# Patient Record
Sex: MALE | Race: BLACK OR AFRICAN AMERICAN | NOT HISPANIC OR LATINO | Employment: UNEMPLOYED | URBAN - METROPOLITAN AREA
[De-identification: names, ages, dates, MRNs, and addresses within clinical notes are randomized per-mention and may not be internally consistent; named-entity substitution may affect disease eponyms.]

---

## 2020-12-14 ENCOUNTER — TELEPHONE (OUTPATIENT)
Dept: ORTHOPEDICS | Facility: CLINIC | Age: 60
End: 2020-12-14

## 2020-12-14 NOTE — TELEPHONE ENCOUNTER
----- Message from Hailey Chery sent at 12/14/2020 10:03 AM CST -----  Contact: Bhavna garcia's wife/[phone]  Who Called:Bhavna garcia's wife   Regarding: referral   Would the patient rather a call back or a response via [facility]? Call back  Best Call Back Number: [phone]  Additional Information:

## 2020-12-15 ENCOUNTER — OFFICE VISIT (OUTPATIENT)
Dept: ORTHOPEDICS | Facility: CLINIC | Age: 60
End: 2020-12-15
Payer: MEDICAID

## 2020-12-15 DIAGNOSIS — M25.552 PAIN OF LEFT HIP JOINT: Primary | ICD-10-CM

## 2020-12-15 PROCEDURE — 99999 PR PBB SHADOW E&M-EST. PATIENT-LVL II: ICD-10-PCS | Mod: PBBFAC,,, | Performed by: ORTHOPAEDIC SURGERY

## 2020-12-15 PROCEDURE — 99202 OFFICE O/P NEW SF 15 MIN: CPT | Mod: S$PBB,,, | Performed by: ORTHOPAEDIC SURGERY

## 2020-12-15 PROCEDURE — 99202 PR OFFICE/OUTPT VISIT, NEW, LEVL II, 15-29 MIN: ICD-10-PCS | Mod: S$PBB,,, | Performed by: ORTHOPAEDIC SURGERY

## 2020-12-15 PROCEDURE — 99212 OFFICE O/P EST SF 10 MIN: CPT | Mod: PBBFAC,PN | Performed by: ORTHOPAEDIC SURGERY

## 2020-12-15 PROCEDURE — 99999 PR PBB SHADOW E&M-EST. PATIENT-LVL II: CPT | Mod: PBBFAC,,, | Performed by: ORTHOPAEDIC SURGERY

## 2020-12-15 RX ORDER — INDOMETHACIN 50 MG/1
50 CAPSULE ORAL 2 TIMES DAILY WITH MEALS
Qty: 60 CAPSULE | Refills: 2 | Status: SHIPPED | OUTPATIENT
Start: 2020-12-15 | End: 2021-01-19 | Stop reason: SDUPTHER

## 2020-12-15 RX ORDER — CETIRIZINE HYDROCHLORIDE 10 MG/1
TABLET ORAL
COMMUNITY
Start: 2020-10-14

## 2020-12-15 RX ORDER — CARVEDILOL 25 MG/1
TABLET ORAL
COMMUNITY
Start: 2020-11-18

## 2020-12-15 RX ORDER — BENAZEPRIL HYDROCHLORIDE 5 MG/1
5 TABLET ORAL DAILY
Status: ON HOLD | COMMUNITY
Start: 2020-11-25 | End: 2022-07-04 | Stop reason: HOSPADM

## 2020-12-15 NOTE — LETTER
December 15, 2020      Christie Márquez Mai, MD  [address] [facility]  [place] [facility] [address]           [facility]  [address], [facility] [place] [address]  [facility] [address]  Phone: [phone]  Fax: [phone]          Patient: Elias Iverson   MR Number: [id number]   YOB: 1960   Date of Visit: 12/15/2020       Dear Dr. Christie Márquez Mai:    Thank you for referring Elias Iverson to me for evaluation. Attached you will find relevant portions of my assessment and plan of care.    If you have questions, please do not hesitate to call me. I look forward to following Elias Iverson along with you.    Sincerely,    Issac Salomon MD    Enclosure  CC:  No Recipients    If you would like to receive this communication electronically, please contact [email or web address] or [phone] to request more information on [employer] access.    For providers and/or their staff who would like to refer a patient to [facility], please contact us through our one-stop-shop provider referral line, [facility], at [phone].    If you feel you have received this communication in error or would no longer like to receive these types of communications, please e-mail [email or web address]

## 2020-12-15 NOTE — PROGRESS NOTES
Subjective:      Patient ID: Elias Iverson is a 60 y.o. male.    Chief Complaint: Consult and Leg Pain (left)    HPI      Elias Iverson is seen for evaluation and treatment of hip pain.  They have experienced problems with their left hip over the past 1 month Pain is located laterally and  referred to the knee. They have been treated with NA.   Symptoms have recently stayed the same. Ambulation reportedly has not been impaired. Self care ADLs are not painful.     Review of Systems   Constitution: Negative for fever and weight loss.   HENT: Negative for congestion.    Eyes: Negative for visual disturbance.   Cardiovascular: Negative for chest pain.   Respiratory: Negative for shortness of breath.    Hematologic/Lymphatic: Negative for bleeding problem. Does not bruise/bleed easily.   Skin: Negative for poor wound healing.   Musculoskeletal: Positive for joint pain.   Gastrointestinal: Negative for abdominal pain.   Genitourinary: Negative for dysuria.   Neurological: Negative for seizures.   Psychiatric/Behavioral: Negative for altered mental status.   Allergic/Immunologic: Negative for persistent infections.         Objective:            Ortho/SPM Exam    Left hip    The patient is not in acute distress.   Body habitus is:normal.   Sclerae normal  The patient walks without a limp.   Respiratory distress:  none  The skin over the hip is:intact.   There is:no local tenderness.   Range of motion- Flexion 80, External rotation 30, internal rotation 20.  Resisted SLR positive.  Pain with rotation positive  Sciatic tension findings negative.  Shortening/lengthening compared to the contralateral side exam deferred.  Pulses DP present, PT present.  Motor normal 5/5 strength in all tested muscle groups.   Sensory normal.    I reviewed the relevant imaging for the patient's condition:  Films showing the left hip and femur show normal bone quality in joint space.  Vascular calcifications noted          Assessment:        Encounter Diagnosis   Name Primary?    Pain of left hip joint Yes          Possible causes include synovitis, early osteoarthritis and avascular necrosis.  Plan:       Elias was seen today for consult and leg pain.    Diagnoses and all orders for this visit:    Pain of left hip joint      I explained my diagnostic impression and the reasoning behind it in detail, using layman's terms.  Models and/or pictures were used to help in the explanation.    Indocin    I explained to the patient that I am providing a prescription for anti-inflammatory medication.  I warned the patient that it should not be taken with other anti-inflammatory medications including over-the-counter products containing ibuprofen and naproxen.  I advised them to consult their primary physician or pharmacist if there is any question about this in the future.  I discussed the possible side effects including cardiovascular issues, renal issues and gastrointestinal problems.  I advised the patient to discontinue the medication should they develop persistent symptoms of dyspepsia.    I also explained that should they elect to continue this medication long-term, that is beyond the prescription that I provide at this time, they should contact their primary physician for refills and appropriate monitoring.    Will consider MRI if there is no improvement

## 2021-01-19 ENCOUNTER — OFFICE VISIT (OUTPATIENT)
Dept: ORTHOPEDICS | Facility: CLINIC | Age: 61
End: 2021-01-19
Payer: MEDICAID

## 2021-01-19 VITALS — BODY MASS INDEX: 28.46 KG/M2 | WEIGHT: 210.13 LBS | HEIGHT: 72 IN

## 2021-01-19 DIAGNOSIS — M25.552 PAIN OF LEFT HIP JOINT: Primary | ICD-10-CM

## 2021-01-19 PROCEDURE — 99999 PR PBB SHADOW E&M-EST. PATIENT-LVL III: ICD-10-PCS | Mod: PBBFAC,,, | Performed by: ORTHOPAEDIC SURGERY

## 2021-01-19 PROCEDURE — 99999 PR PBB SHADOW E&M-EST. PATIENT-LVL III: CPT | Mod: PBBFAC,,, | Performed by: ORTHOPAEDIC SURGERY

## 2021-01-19 PROCEDURE — 99213 PR OFFICE/OUTPT VISIT, EST, LEVL III, 20-29 MIN: ICD-10-PCS | Mod: S$PBB,,, | Performed by: ORTHOPAEDIC SURGERY

## 2021-01-19 PROCEDURE — 99213 OFFICE O/P EST LOW 20 MIN: CPT | Mod: S$PBB,,, | Performed by: ORTHOPAEDIC SURGERY

## 2021-01-19 PROCEDURE — 99213 OFFICE O/P EST LOW 20 MIN: CPT | Mod: PBBFAC,PN | Performed by: ORTHOPAEDIC SURGERY

## 2021-01-19 RX ORDER — INDOMETHACIN 50 MG/1
50 CAPSULE ORAL 2 TIMES DAILY WITH MEALS
Qty: 60 CAPSULE | Refills: 2 | Status: SHIPPED | OUTPATIENT
Start: 2021-01-19 | End: 2022-01-03 | Stop reason: SDUPTHER

## 2021-02-21 ENCOUNTER — PATIENT MESSAGE (OUTPATIENT)
Dept: ADMINISTRATIVE | Facility: CLINIC | Age: 61
End: 2021-02-21

## 2021-02-24 ENCOUNTER — IMMUNIZATION (OUTPATIENT)
Dept: PHARMACY | Facility: CLINIC | Age: 61
End: 2021-02-24
Payer: MEDICAID

## 2021-02-24 DIAGNOSIS — Z23 NEED FOR VACCINATION: Primary | ICD-10-CM

## 2021-03-02 ENCOUNTER — OFFICE VISIT (OUTPATIENT)
Dept: ORTHOPEDICS | Facility: CLINIC | Age: 61
End: 2021-03-02
Payer: MEDICAID

## 2021-03-02 VITALS — BODY MASS INDEX: 28.46 KG/M2 | HEIGHT: 72 IN | WEIGHT: 210.13 LBS

## 2021-03-02 DIAGNOSIS — M54.9 DORSALGIA, UNSPECIFIED: ICD-10-CM

## 2021-03-02 DIAGNOSIS — M51.36 LUMBAR DEGENERATIVE DISC DISEASE: Primary | ICD-10-CM

## 2021-03-02 PROCEDURE — 99213 OFFICE O/P EST LOW 20 MIN: CPT | Mod: S$PBB,,, | Performed by: ORTHOPAEDIC SURGERY

## 2021-03-02 PROCEDURE — 99999 PR PBB SHADOW E&M-EST. PATIENT-LVL III: CPT | Mod: PBBFAC,,, | Performed by: ORTHOPAEDIC SURGERY

## 2021-03-02 PROCEDURE — 99999 PR PBB SHADOW E&M-EST. PATIENT-LVL III: ICD-10-PCS | Mod: PBBFAC,,, | Performed by: ORTHOPAEDIC SURGERY

## 2021-03-02 PROCEDURE — 99213 PR OFFICE/OUTPT VISIT, EST, LEVL III, 20-29 MIN: ICD-10-PCS | Mod: S$PBB,,, | Performed by: ORTHOPAEDIC SURGERY

## 2021-03-02 PROCEDURE — 99213 OFFICE O/P EST LOW 20 MIN: CPT | Mod: PBBFAC,PN | Performed by: ORTHOPAEDIC SURGERY

## 2021-03-09 ENCOUNTER — TELEPHONE (OUTPATIENT)
Dept: ORTHOPEDICS | Facility: CLINIC | Age: 61
End: 2021-03-09

## 2021-03-09 DIAGNOSIS — M51.36 LUMBAR DEGENERATIVE DISC DISEASE: Primary | ICD-10-CM

## 2021-03-24 ENCOUNTER — IMMUNIZATION (OUTPATIENT)
Dept: PHARMACY | Facility: CLINIC | Age: 61
End: 2021-03-24

## 2021-03-24 DIAGNOSIS — Z23 NEED FOR VACCINATION: Primary | ICD-10-CM

## 2021-03-29 ENCOUNTER — CLINICAL SUPPORT (OUTPATIENT)
Dept: REHABILITATION | Facility: HOSPITAL | Age: 61
End: 2021-03-29
Payer: MEDICAID

## 2021-03-29 DIAGNOSIS — M54.50 ACUTE MIDLINE LOW BACK PAIN WITHOUT SCIATICA: ICD-10-CM

## 2021-03-29 DIAGNOSIS — M53.86 DECREASED RANGE OF MOTION OF LUMBAR SPINE: ICD-10-CM

## 2021-03-29 DIAGNOSIS — M51.36 LUMBAR DEGENERATIVE DISC DISEASE: ICD-10-CM

## 2021-03-29 PROCEDURE — 97161 PT EVAL LOW COMPLEX 20 MIN: CPT | Mod: PN

## 2021-03-30 PROBLEM — M53.86 DECREASED RANGE OF MOTION OF LUMBAR SPINE: Status: ACTIVE | Noted: 2021-03-30

## 2021-03-30 PROBLEM — M54.50 ACUTE MIDLINE LOW BACK PAIN WITHOUT SCIATICA: Status: ACTIVE | Noted: 2021-03-30

## 2021-04-21 ENCOUNTER — CLINICAL SUPPORT (OUTPATIENT)
Dept: REHABILITATION | Facility: HOSPITAL | Age: 61
End: 2021-04-21
Payer: MEDICAID

## 2021-04-21 DIAGNOSIS — M54.50 ACUTE MIDLINE LOW BACK PAIN WITHOUT SCIATICA: ICD-10-CM

## 2021-04-21 DIAGNOSIS — M53.86 DECREASED RANGE OF MOTION OF LUMBAR SPINE: ICD-10-CM

## 2021-04-21 PROCEDURE — 97110 THERAPEUTIC EXERCISES: CPT | Mod: PN

## 2021-04-26 ENCOUNTER — CLINICAL SUPPORT (OUTPATIENT)
Dept: REHABILITATION | Facility: HOSPITAL | Age: 61
End: 2021-04-26
Payer: MEDICAID

## 2021-04-26 DIAGNOSIS — M54.50 ACUTE MIDLINE LOW BACK PAIN WITHOUT SCIATICA: ICD-10-CM

## 2021-04-26 DIAGNOSIS — M53.86 DECREASED RANGE OF MOTION OF LUMBAR SPINE: ICD-10-CM

## 2021-04-26 PROCEDURE — 97110 THERAPEUTIC EXERCISES: CPT | Mod: PN

## 2021-04-26 PROCEDURE — 97140 MANUAL THERAPY 1/> REGIONS: CPT | Mod: PN

## 2021-04-28 ENCOUNTER — CLINICAL SUPPORT (OUTPATIENT)
Dept: REHABILITATION | Facility: HOSPITAL | Age: 61
End: 2021-04-28
Payer: MEDICAID

## 2021-04-28 DIAGNOSIS — M53.86 DECREASED RANGE OF MOTION OF LUMBAR SPINE: ICD-10-CM

## 2021-04-28 DIAGNOSIS — M54.50 ACUTE MIDLINE LOW BACK PAIN WITHOUT SCIATICA: ICD-10-CM

## 2021-04-28 PROCEDURE — 97110 THERAPEUTIC EXERCISES: CPT | Mod: PN

## 2021-04-28 PROCEDURE — 97140 MANUAL THERAPY 1/> REGIONS: CPT | Mod: PN

## 2021-05-05 ENCOUNTER — CLINICAL SUPPORT (OUTPATIENT)
Dept: REHABILITATION | Facility: HOSPITAL | Age: 61
End: 2021-05-05
Payer: MEDICAID

## 2021-05-05 DIAGNOSIS — M53.86 DECREASED RANGE OF MOTION OF LUMBAR SPINE: ICD-10-CM

## 2021-05-05 DIAGNOSIS — M54.50 ACUTE MIDLINE LOW BACK PAIN WITHOUT SCIATICA: ICD-10-CM

## 2021-05-05 PROCEDURE — 97110 THERAPEUTIC EXERCISES: CPT | Mod: PN

## 2021-05-05 PROCEDURE — 97140 MANUAL THERAPY 1/> REGIONS: CPT | Mod: PN

## 2021-05-06 ENCOUNTER — OFFICE VISIT (OUTPATIENT)
Dept: ORTHOPEDICS | Facility: CLINIC | Age: 61
End: 2021-05-06
Payer: MEDICAID

## 2021-05-06 VITALS
BODY MASS INDEX: 29.12 KG/M2 | DIASTOLIC BLOOD PRESSURE: 78 MMHG | HEIGHT: 72 IN | SYSTOLIC BLOOD PRESSURE: 136 MMHG | WEIGHT: 215 LBS

## 2021-05-06 DIAGNOSIS — M25.552 PAIN OF LEFT HIP JOINT: ICD-10-CM

## 2021-05-06 DIAGNOSIS — M54.9 DORSALGIA, UNSPECIFIED: ICD-10-CM

## 2021-05-06 DIAGNOSIS — M54.14 THORACIC AND LUMBOSACRAL NEURITIS: ICD-10-CM

## 2021-05-06 DIAGNOSIS — M54.42 BILATERAL LOW BACK PAIN WITH LEFT-SIDED SCIATICA, UNSPECIFIED CHRONICITY: Primary | ICD-10-CM

## 2021-05-06 DIAGNOSIS — M54.17 THORACIC AND LUMBOSACRAL NEURITIS: ICD-10-CM

## 2021-05-06 PROCEDURE — 99999 PR PBB SHADOW E&M-EST. PATIENT-LVL III: CPT | Mod: PBBFAC,,, | Performed by: PHYSICIAN ASSISTANT

## 2021-05-06 PROCEDURE — 99999 PR PBB SHADOW E&M-EST. PATIENT-LVL III: ICD-10-PCS | Mod: PBBFAC,,, | Performed by: PHYSICIAN ASSISTANT

## 2021-05-06 PROCEDURE — 99214 PR OFFICE/OUTPT VISIT, EST, LEVL IV, 30-39 MIN: ICD-10-PCS | Mod: S$PBB,,, | Performed by: PHYSICIAN ASSISTANT

## 2021-05-06 PROCEDURE — 99214 OFFICE O/P EST MOD 30 MIN: CPT | Mod: S$PBB,,, | Performed by: PHYSICIAN ASSISTANT

## 2021-05-06 PROCEDURE — 99213 OFFICE O/P EST LOW 20 MIN: CPT | Mod: PBBFAC,PN | Performed by: PHYSICIAN ASSISTANT

## 2021-05-07 ENCOUNTER — PATIENT MESSAGE (OUTPATIENT)
Dept: ORTHOPEDICS | Facility: CLINIC | Age: 61
End: 2021-05-07

## 2021-05-14 ENCOUNTER — PATIENT MESSAGE (OUTPATIENT)
Dept: ORTHOPEDICS | Facility: CLINIC | Age: 61
End: 2021-05-14

## 2021-06-16 ENCOUNTER — DOCUMENTATION ONLY (OUTPATIENT)
Dept: REHABILITATION | Facility: HOSPITAL | Age: 61
End: 2021-06-16

## 2021-06-16 PROBLEM — M54.50 ACUTE MIDLINE LOW BACK PAIN WITHOUT SCIATICA: Status: RESOLVED | Noted: 2021-03-30 | Resolved: 2021-06-16

## 2021-06-16 PROBLEM — M53.86 DECREASED RANGE OF MOTION OF LUMBAR SPINE: Status: RESOLVED | Noted: 2021-03-30 | Resolved: 2021-06-16

## 2021-08-23 ENCOUNTER — IMMUNIZATION (OUTPATIENT)
Dept: PHARMACY | Facility: CLINIC | Age: 61
End: 2021-08-23
Payer: MEDICAID

## 2021-08-23 DIAGNOSIS — Z23 NEED FOR VACCINATION: Primary | ICD-10-CM

## 2022-01-03 ENCOUNTER — TELEPHONE (OUTPATIENT)
Dept: ORTHOPEDICS | Facility: CLINIC | Age: 62
End: 2022-01-03
Payer: MEDICAID

## 2022-01-03 DIAGNOSIS — M25.552 PAIN OF LEFT HIP JOINT: Primary | ICD-10-CM

## 2022-01-03 RX ORDER — INDOMETHACIN 50 MG/1
50 CAPSULE ORAL 2 TIMES DAILY WITH MEALS
Qty: 60 CAPSULE | Refills: 2 | Status: SHIPPED | OUTPATIENT
Start: 2022-01-03 | End: 2022-01-04

## 2022-01-03 NOTE — TELEPHONE ENCOUNTER
Patient walked into clinic asking for refill of indocin.     He is having left groin/hip pain again and indocin helped. Refill sent to [facility] in [place].     If no improvement, he will make f/u with me or Aime.

## 2022-01-18 DIAGNOSIS — M25.552 BILATERAL HIP PAIN: Primary | ICD-10-CM

## 2022-01-18 DIAGNOSIS — M25.551 BILATERAL HIP PAIN: Primary | ICD-10-CM

## 2022-01-18 NOTE — PROGRESS NOTES
Subjective:      Patient ID: Elias Iverson is a 61 y.o. male.    Chief Complaint: No chief complaint on file.      HPI  (Aime)    Last seen by me on 5/6/21 for LBP and left groin pain that was felt to be spine mediated. No relief with PT. Left hip XRs looked good.     MRI of lumbar spine 5/14/21 showed facet hypertrophy L3-S1 with mild bilateral foraminal stenosis L5-S1. He was to follow up with PCP to discuss referral to spine specialist. Was told no one was in network with his insurance.     He presents today complaining of constant left groin pain that radiates to anterior thigh x 5 days. Prior to this, he was doing okay with intermittent pain. Pain is worse with standing and walking. No pain in right hip or below knee on left. No numbness or tingling. He rates his pain as a 6 on a scale of 1-10. Pain is hurting. No LBP.     Some relief with indocin.       Past Medical History:   Diagnosis Date    Hypertension          Current Outpatient Medications:     benazepriL (LOTENSIN) 20 MG tablet, TK 1 T PO  QD, Disp: , Rfl:     carvediloL (COREG) 25 MG tablet, TK 1 T PO BID, Disp: , Rfl:     cetirizine (ZYRTEC) 10 MG tablet, TK 1 T PO QD, Disp: , Rfl:     cyclobenzaprine (FLEXERIL) 10 MG tablet, Take 10 mg by mouth 3 (three) times daily as needed for Muscle spasms., Disp: , Rfl:     indomethacin (INDOCIN) 50 MG capsule, TAKE 1 CAPSULE(50 MG) BY MOUTH TWICE DAILY WITH MEALS, Disp: 60 capsule, Rfl: 2    methylPREDNISolone (MEDROL DOSEPACK) 4 mg tablet, use as directed x 6 days, Disp: 1 each, Rfl: 0    Review of patient's allergies indicates:  No Known Allergies    Review of Systems   Constitutional: Negative for chills, fever, night sweats and weight gain.   Gastrointestinal: Negative for bowel incontinence, nausea and vomiting.   Genitourinary: Negative for bladder incontinence.   Neurological: Negative for disturbances in coordination and loss of balance.         Objective:        BP (!) 142/86   Resp 20    Wt 98 kg (216 lb)   BMI 29.29 kg/m²     Ortho/SPM Exam    Body habitus is::normal.   The patient walks with a limp.     Strength testing of the bilateral LEs shows  Right hip abduction:  +5/5  Left hip abduction:  +5/5  Right hip flexion:  +5/5   Left hip flexion:  +5/5  Right hip extensors:  +5/5  Left hip extensors:  +5/5  Right quadriceps:  +5/5  Left quadriceps:  +5/5  Right hamstring:  +5/5  Left hamstring:  +5/5  Right dorsiflexion:  +5/5  Left dorsiflexion:  +5/5  Right plantar flexion:  +5/5  Left plantar flexion:  +5/5 Right EHL:  +5/5   Left EHL:  +5/5      RIGHT HIP EXAM:  Range of motion- Flexion full, External rotation full, internal rotation full.  Pain with rotation negative  Sciatic tension findings negative.  Sensory normal.      LEFT HIP EXAM:  There is::mild tenderness left groin.   Good ROM of hip. Hip/groin pain is constant and hip ROM does not make him worse.   No worsening pain with rotation   Sciatic tension findings positive  Sensory normal.      XRAY INTERPRETATION:  X-rays of both hips dated 1/20/22 are personally reviewed and show good maintenance of joint space both hips.        Assessment:       Encounter Diagnosis   Name Primary?    Left groin pain Yes          Plan:       Diagnoses and all orders for this visit:    Left groin pain    Other orders  -     methylPREDNISolone (MEDROL DOSEPACK) 4 mg tablet; use as directed x 6 days      He presents today complaining of constant left groin pain that radiates to anterior thigh x 5 days. Prior to this, he was doing okay with intermittent pain. No pain in right hip or below knee on left. No current LBP.     Bilateral hip XRs show good maintenance of joint space. Previous lumbar MRI 5/14/21 showed facet hypertrophy L3-S1 with mild bilateral foraminal stenosis L5-S1.     No worsening of pain with IR/ER of left hip. Pain does not appear hip mediated.     Treatment options reviewed with patient along with above bilateral hip XRs and following  plan made:     - Medrol dose pack for symptom relief. Reviewed dosing and side effects. Stop indocin while taking this.   - Okay to continue with prn flexeril.   - Will email him next week to check on him.   - If no better, may consider PT (did not help last year) and/or referral to spine specialist.     Follow up if symptoms worsen or fail to improve.

## 2022-01-20 ENCOUNTER — OFFICE VISIT (OUTPATIENT)
Dept: ORTHOPEDICS | Facility: CLINIC | Age: 62
End: 2022-01-20
Payer: MEDICAID

## 2022-01-20 VITALS
WEIGHT: 216 LBS | RESPIRATION RATE: 20 BRPM | SYSTOLIC BLOOD PRESSURE: 142 MMHG | DIASTOLIC BLOOD PRESSURE: 86 MMHG | BODY MASS INDEX: 29.29 KG/M2

## 2022-01-20 DIAGNOSIS — R10.32 LEFT GROIN PAIN: Primary | ICD-10-CM

## 2022-01-20 PROCEDURE — 1160F RVW MEDS BY RX/DR IN RCRD: CPT | Mod: CPTII,,, | Performed by: PHYSICIAN ASSISTANT

## 2022-01-20 PROCEDURE — 3079F PR MOST RECENT DIASTOLIC BLOOD PRESSURE 80-89 MM HG: ICD-10-PCS | Mod: CPTII,,, | Performed by: PHYSICIAN ASSISTANT

## 2022-01-20 PROCEDURE — 99213 OFFICE O/P EST LOW 20 MIN: CPT | Mod: PBBFAC,PN | Performed by: PHYSICIAN ASSISTANT

## 2022-01-20 PROCEDURE — 1159F MED LIST DOCD IN RCRD: CPT | Mod: CPTII,,, | Performed by: PHYSICIAN ASSISTANT

## 2022-01-20 PROCEDURE — 1160F PR REVIEW ALL MEDS BY PRESCRIBER/CLIN PHARMACIST DOCUMENTED: ICD-10-PCS | Mod: CPTII,,, | Performed by: PHYSICIAN ASSISTANT

## 2022-01-20 PROCEDURE — 3077F PR MOST RECENT SYSTOLIC BLOOD PRESSURE >= 140 MM HG: ICD-10-PCS | Mod: CPTII,,, | Performed by: PHYSICIAN ASSISTANT

## 2022-01-20 PROCEDURE — 3008F PR BODY MASS INDEX (BMI) DOCUMENTED: ICD-10-PCS | Mod: CPTII,,, | Performed by: PHYSICIAN ASSISTANT

## 2022-01-20 PROCEDURE — 99213 OFFICE O/P EST LOW 20 MIN: CPT | Mod: S$PBB,,, | Performed by: PHYSICIAN ASSISTANT

## 2022-01-20 PROCEDURE — 99213 PR OFFICE/OUTPT VISIT, EST, LEVL III, 20-29 MIN: ICD-10-PCS | Mod: S$PBB,,, | Performed by: PHYSICIAN ASSISTANT

## 2022-01-20 PROCEDURE — 1159F PR MEDICATION LIST DOCUMENTED IN MEDICAL RECORD: ICD-10-PCS | Mod: CPTII,,, | Performed by: PHYSICIAN ASSISTANT

## 2022-01-20 PROCEDURE — 3008F BODY MASS INDEX DOCD: CPT | Mod: CPTII,,, | Performed by: PHYSICIAN ASSISTANT

## 2022-01-20 PROCEDURE — 3079F DIAST BP 80-89 MM HG: CPT | Mod: CPTII,,, | Performed by: PHYSICIAN ASSISTANT

## 2022-01-20 PROCEDURE — 99999 PR PBB SHADOW E&M-EST. PATIENT-LVL III: ICD-10-PCS | Mod: PBBFAC,,, | Performed by: PHYSICIAN ASSISTANT

## 2022-01-20 PROCEDURE — 3077F SYST BP >= 140 MM HG: CPT | Mod: CPTII,,, | Performed by: PHYSICIAN ASSISTANT

## 2022-01-20 PROCEDURE — 99999 PR PBB SHADOW E&M-EST. PATIENT-LVL III: CPT | Mod: PBBFAC,,, | Performed by: PHYSICIAN ASSISTANT

## 2022-01-20 RX ORDER — METHYLPREDNISOLONE 4 MG/1
TABLET ORAL
Qty: 1 EACH | Refills: 0 | Status: ON HOLD | OUTPATIENT
Start: 2022-01-20 | End: 2022-07-04 | Stop reason: HOSPADM

## 2022-01-20 NOTE — PATIENT INSTRUCTIONS
It was good to see you again. I'm sorry you are hurting so much.     Your hip xrays look great. This pain may be from your back.     I sent a steroid pack to your pharmacy. Take as directed. Stop the indomethacin while you are taking the steroids. You can continue on cyclobenzaprine (flexeril) as needed.     I will message you next week to check on you. Let me know if you need anything prior to that.     Tammie   [phone]   [employer]

## 2022-01-28 ENCOUNTER — PATIENT MESSAGE (OUTPATIENT)
Dept: ORTHOPEDICS | Facility: CLINIC | Age: 62
End: 2022-01-28
Payer: MEDICAID

## 2022-02-02 ENCOUNTER — CLINICAL SUPPORT (OUTPATIENT)
Dept: OTHER | Facility: CLINIC | Age: 62
End: 2022-02-02
Payer: MEDICAID

## 2022-02-02 DIAGNOSIS — Z00.8 ENCOUNTER FOR OTHER GENERAL EXAMINATION: ICD-10-CM

## 2022-02-03 VITALS — HEIGHT: 72 IN | BODY MASS INDEX: 29.29 KG/M2

## 2022-02-03 LAB
HDLC SERPL-MCNC: 61 MG/DL
POC CHOLESTEROL, LDL (DOCK): 65 MG/DL
POC CHOLESTEROL, TOTAL: 195 MG/DL
POC GLUCOSE, FASTING: 148 MG/DL (ref 60–110)
TRIGL SERPL-MCNC: 347 MG/DL

## 2022-03-21 NOTE — PROGRESS NOTES
"Has PCP. Discussed diet and lifestyle for management of glu and trg. Pt reports consuming a lot of "junk food" over recent weeks. Encouraged FU with PCP re: glu and trg in 3 mo. Pt reports situational stress and due for BP medication. Last dose 16h ago. Enc to check BP at home over upcoming weeks to ensure it returns to baseline. FU with PCP if remains elevated. KETAN Delcid RN.  Results reviewed with and educational material given to the participant. This screening was performed in the [facility] office by KETAN Delcid RN.  "

## 2022-07-03 ENCOUNTER — HOSPITAL ENCOUNTER (OUTPATIENT)
Facility: HOSPITAL | Age: 62
Discharge: HOME | DRG: 065 | End: 2022-07-04
Attending: EMERGENCY MEDICINE | Admitting: PSYCHIATRY & NEUROLOGY
Payer: MEDICAID

## 2022-07-03 DIAGNOSIS — I63.9 STROKE: ICD-10-CM

## 2022-07-03 DIAGNOSIS — I63.511 ACUTE ISCHEMIC RIGHT MCA STROKE: ICD-10-CM

## 2022-07-03 DIAGNOSIS — R53.1 ACUTE LEFT-SIDED WEAKNESS: Primary | ICD-10-CM

## 2022-07-03 DIAGNOSIS — Z92.82 STATUS POST ADMINISTRATION OF TPA (RTPA) IN A DIFFERENT FACILITY WITHIN THE LAST 24 HOURS PRIOR TO ADMISSION TO CURRENT FACILITY: ICD-10-CM

## 2022-07-03 PROBLEM — E78.5 HYPERLIPIDEMIA: Status: ACTIVE | Noted: 2022-07-03

## 2022-07-03 PROBLEM — I63.311 THROMBOTIC STROKE INVOLVING RIGHT MIDDLE CEREBRAL ARTERY: Status: ACTIVE | Noted: 2022-07-03

## 2022-07-03 PROBLEM — E78.2 MIXED HYPERLIPIDEMIA: Status: ACTIVE | Noted: 2022-07-03

## 2022-07-03 PROBLEM — I10 ESSENTIAL HYPERTENSION: Status: ACTIVE | Noted: 2022-07-03

## 2022-07-03 PROBLEM — E11.9 TYPE 2 DIABETES MELLITUS: Status: ACTIVE | Noted: 2022-07-03

## 2022-07-03 LAB
ALBUMIN SERPL BCP-MCNC: 3.9 G/DL (ref 3.5–5.2)
ALP SERPL-CCNC: 57 U/L (ref 55–135)
ALT SERPL W/O P-5'-P-CCNC: 55 U/L (ref 10–44)
ANION GAP SERPL CALC-SCNC: 9 MMOL/L (ref 8–16)
AST SERPL-CCNC: 29 U/L (ref 10–40)
BACTERIA #/AREA URNS AUTO: ABNORMAL /HPF
BASOPHILS # BLD AUTO: 0.02 K/UL (ref 0–0.2)
BASOPHILS NFR BLD: 0.3 % (ref 0–1.9)
BILIRUB SERPL-MCNC: 0.6 MG/DL (ref 0.1–1)
BILIRUB UR QL STRIP: NEGATIVE
BUN SERPL-MCNC: 22 MG/DL (ref 6–20)
CALCIUM SERPL-MCNC: 8.8 MG/DL (ref 8.7–10.5)
CHLORIDE SERPL-SCNC: 107 MMOL/L (ref 95–110)
CHOLEST SERPL-MCNC: 149 MG/DL (ref 120–199)
CHOLEST/HDLC SERPL: 3.5 {RATIO} (ref 2–5)
CLARITY UR REFRACT.AUTO: CLEAR
CO2 SERPL-SCNC: 24 MMOL/L (ref 23–29)
COLOR UR AUTO: ABNORMAL
CREAT SERPL-MCNC: 0.9 MG/DL (ref 0.5–1.4)
CREAT SERPL-MCNC: 0.9 MG/DL (ref 0.5–1.4)
CTP QC/QA: YES
DIFFERENTIAL METHOD: ABNORMAL
EOSINOPHIL # BLD AUTO: 0.1 K/UL (ref 0–0.5)
EOSINOPHIL NFR BLD: 2.3 % (ref 0–8)
ERYTHROCYTE [DISTWIDTH] IN BLOOD BY AUTOMATED COUNT: 12.6 % (ref 11.5–14.5)
EST. GFR  (AFRICAN AMERICAN): >60 ML/MIN/1.73 M^2
EST. GFR  (NON AFRICAN AMERICAN): >60 ML/MIN/1.73 M^2
ESTIMATED AVG GLUCOSE: 134 MG/DL (ref 68–131)
GLUCOSE SERPL-MCNC: 82 MG/DL (ref 70–110)
GLUCOSE UR QL STRIP: NEGATIVE
HBA1C MFR BLD: 6.3 % (ref 4–5.6)
HCT VFR BLD AUTO: 40.8 % (ref 40–54)
HDLC SERPL-MCNC: 43 MG/DL (ref 40–75)
HDLC SERPL: 28.9 % (ref 20–50)
HGB BLD-MCNC: 13.6 G/DL (ref 14–18)
HGB UR QL STRIP: NEGATIVE
IMM GRANULOCYTES # BLD AUTO: 0.01 K/UL (ref 0–0.04)
IMM GRANULOCYTES NFR BLD AUTO: 0.2 % (ref 0–0.5)
INR PPP: 1.1 (ref 0.8–1.2)
KETONES UR QL STRIP: NEGATIVE
LDLC SERPL CALC-MCNC: 79.2 MG/DL (ref 63–159)
LEUKOCYTE ESTERASE UR QL STRIP: ABNORMAL
LYMPHOCYTES # BLD AUTO: 1.8 K/UL (ref 1–4.8)
LYMPHOCYTES NFR BLD: 29.8 % (ref 18–48)
MCH RBC QN AUTO: 30.1 PG (ref 27–31)
MCHC RBC AUTO-ENTMCNC: 33.3 G/DL (ref 32–36)
MCV RBC AUTO: 90 FL (ref 82–98)
MICROSCOPIC COMMENT: ABNORMAL
MONOCYTES # BLD AUTO: 1 K/UL (ref 0.3–1)
MONOCYTES NFR BLD: 16.7 % (ref 4–15)
NEUTROPHILS # BLD AUTO: 3.1 K/UL (ref 1.8–7.7)
NEUTROPHILS NFR BLD: 50.7 % (ref 38–73)
NITRITE UR QL STRIP: NEGATIVE
NONHDLC SERPL-MCNC: 106 MG/DL
NRBC BLD-RTO: 0 /100 WBC
PH UR STRIP: 6 [PH] (ref 5–8)
PLATELET # BLD AUTO: 130 K/UL (ref 150–450)
PMV BLD AUTO: 12.3 FL (ref 9.2–12.9)
POC PTINR: <0.9 (ref 0.9–1.2)
POC PTWBT: ABNORMAL SEC
POCT GLUCOSE: 105 MG/DL (ref 70–110)
POCT GLUCOSE: 80 MG/DL (ref 70–110)
POCT GLUCOSE: 81 MG/DL (ref 70–110)
POTASSIUM SERPL-SCNC: 4.2 MMOL/L (ref 3.5–5.1)
PROT SERPL-MCNC: 6.6 G/DL (ref 6–8.4)
PROT UR QL STRIP: NEGATIVE
PROTHROMBIN TIME: 11.3 SEC (ref 9–12.5)
RBC # BLD AUTO: 4.52 M/UL (ref 4.6–6.2)
RBC #/AREA URNS AUTO: 5 /HPF (ref 0–4)
SAMPLE: ABNORMAL
SAMPLE: NORMAL
SARS-COV-2 RDRP RESP QL NAA+PROBE: NEGATIVE
SODIUM SERPL-SCNC: 140 MMOL/L (ref 136–145)
SP GR UR STRIP: >=1.03 (ref 1–1.03)
SQUAMOUS #/AREA URNS AUTO: 1 /HPF
TRIGL SERPL-MCNC: 134 MG/DL (ref 30–150)
TSH SERPL DL<=0.005 MIU/L-ACNC: 0.51 UIU/ML (ref 0.4–4)
URN SPEC COLLECT METH UR: ABNORMAL
WBC # BLD AUTO: 6.18 K/UL (ref 3.9–12.7)
WBC #/AREA URNS AUTO: 14 /HPF (ref 0–5)

## 2022-07-03 PROCEDURE — 83036 HEMOGLOBIN GLYCOSYLATED A1C: CPT

## 2022-07-03 PROCEDURE — 99223 PR INITIAL HOSPITAL CARE,LEVL III: ICD-10-PCS | Mod: ,,, | Performed by: PSYCHIATRY & NEUROLOGY

## 2022-07-03 PROCEDURE — 84443 ASSAY THYROID STIM HORMONE: CPT | Performed by: EMERGENCY MEDICINE

## 2022-07-03 PROCEDURE — 25000003 PHARM REV CODE 250

## 2022-07-03 PROCEDURE — 25000003 PHARM REV CODE 250: Performed by: PHYSICIAN ASSISTANT

## 2022-07-03 PROCEDURE — 81001 URINALYSIS AUTO W/SCOPE: CPT

## 2022-07-03 PROCEDURE — 99291 CRITICAL CARE FIRST HOUR: CPT | Mod: CS,,, | Performed by: EMERGENCY MEDICINE

## 2022-07-03 PROCEDURE — U0002 COVID-19 LAB TEST NON-CDC: HCPCS

## 2022-07-03 PROCEDURE — 99900035 HC TECH TIME PER 15 MIN (STAT)

## 2022-07-03 PROCEDURE — 85610 PROTHROMBIN TIME: CPT | Mod: 91 | Performed by: EMERGENCY MEDICINE

## 2022-07-03 PROCEDURE — 93005 ELECTROCARDIOGRAM TRACING: CPT

## 2022-07-03 PROCEDURE — 93010 EKG 12-LEAD: ICD-10-PCS | Mod: ,,, | Performed by: INTERNAL MEDICINE

## 2022-07-03 PROCEDURE — 82565 ASSAY OF CREATININE: CPT

## 2022-07-03 PROCEDURE — 63600175 PHARM REV CODE 636 W HCPCS

## 2022-07-03 PROCEDURE — 99223 1ST HOSP IP/OBS HIGH 75: CPT | Mod: ,,, | Performed by: PSYCHIATRY & NEUROLOGY

## 2022-07-03 PROCEDURE — 94761 N-INVAS EAR/PLS OXIMETRY MLT: CPT

## 2022-07-03 PROCEDURE — 93010 ELECTROCARDIOGRAM REPORT: CPT | Mod: ,,, | Performed by: INTERNAL MEDICINE

## 2022-07-03 PROCEDURE — 80061 LIPID PANEL: CPT | Performed by: EMERGENCY MEDICINE

## 2022-07-03 PROCEDURE — G0378 HOSPITAL OBSERVATION PER HR: HCPCS

## 2022-07-03 PROCEDURE — 96375 TX/PRO/DX INJ NEW DRUG ADDON: CPT

## 2022-07-03 PROCEDURE — 85025 COMPLETE CBC W/AUTO DIFF WBC: CPT | Mod: 91 | Performed by: EMERGENCY MEDICINE

## 2022-07-03 PROCEDURE — 99291 CRITICAL CARE FIRST HOUR: CPT | Mod: 25

## 2022-07-03 PROCEDURE — 96374 THER/PROPH/DIAG INJ IV PUSH: CPT

## 2022-07-03 PROCEDURE — 82962 GLUCOSE BLOOD TEST: CPT

## 2022-07-03 PROCEDURE — 20000000 HC ICU ROOM

## 2022-07-03 PROCEDURE — 96376 TX/PRO/DX INJ SAME DRUG ADON: CPT

## 2022-07-03 PROCEDURE — 99291 PR CRITICAL CARE, E/M 30-74 MINUTES: ICD-10-PCS | Mod: CS,,, | Performed by: EMERGENCY MEDICINE

## 2022-07-03 PROCEDURE — 80053 COMPREHEN METABOLIC PANEL: CPT | Mod: 91 | Performed by: EMERGENCY MEDICINE

## 2022-07-03 PROCEDURE — 85610 PROTHROMBIN TIME: CPT

## 2022-07-03 RX ORDER — AMOXICILLIN 250 MG
1 CAPSULE ORAL 2 TIMES DAILY
Status: DISCONTINUED | OUTPATIENT
Start: 2022-07-03 | End: 2022-07-04 | Stop reason: HOSPADM

## 2022-07-03 RX ORDER — LABETALOL HCL 20 MG/4 ML
10 SYRINGE (ML) INTRAVENOUS EVERY 4 HOURS PRN
Status: DISCONTINUED | OUTPATIENT
Start: 2022-07-03 | End: 2022-07-04 | Stop reason: HOSPADM

## 2022-07-03 RX ORDER — INSULIN ASPART 100 [IU]/ML
1-10 INJECTION, SOLUTION INTRAVENOUS; SUBCUTANEOUS
Status: DISCONTINUED | OUTPATIENT
Start: 2022-07-03 | End: 2022-07-04 | Stop reason: HOSPADM

## 2022-07-03 RX ORDER — SODIUM CHLORIDE 0.9 % (FLUSH) 0.9 %
10 SYRINGE (ML) INJECTION
Status: DISCONTINUED | OUTPATIENT
Start: 2022-07-03 | End: 2022-07-04 | Stop reason: HOSPADM

## 2022-07-03 RX ORDER — IBUPROFEN 200 MG
24 TABLET ORAL
Status: DISCONTINUED | OUTPATIENT
Start: 2022-07-03 | End: 2022-07-04 | Stop reason: HOSPADM

## 2022-07-03 RX ORDER — ATORVASTATIN CALCIUM 20 MG/1
40 TABLET, FILM COATED ORAL DAILY
Status: DISCONTINUED | OUTPATIENT
Start: 2022-07-04 | End: 2022-07-04 | Stop reason: HOSPADM

## 2022-07-03 RX ORDER — HYDRALAZINE HYDROCHLORIDE 20 MG/ML
10 INJECTION INTRAMUSCULAR; INTRAVENOUS ONCE
Status: COMPLETED | OUTPATIENT
Start: 2022-07-03 | End: 2022-07-03

## 2022-07-03 RX ORDER — LIDOCAINE 50 MG/G
1 PATCH TOPICAL
Status: DISCONTINUED | OUTPATIENT
Start: 2022-07-03 | End: 2022-07-04 | Stop reason: HOSPADM

## 2022-07-03 RX ORDER — GLUCAGON 1 MG
1 KIT INJECTION
Status: DISCONTINUED | OUTPATIENT
Start: 2022-07-03 | End: 2022-07-04 | Stop reason: HOSPADM

## 2022-07-03 RX ORDER — CARVEDILOL 25 MG/1
25 TABLET ORAL 2 TIMES DAILY
Status: DISCONTINUED | OUTPATIENT
Start: 2022-07-03 | End: 2022-07-04 | Stop reason: HOSPADM

## 2022-07-03 RX ORDER — HYDRALAZINE HYDROCHLORIDE 20 MG/ML
10 INJECTION INTRAMUSCULAR; INTRAVENOUS EVERY 6 HOURS PRN
Status: DISCONTINUED | OUTPATIENT
Start: 2022-07-03 | End: 2022-07-04 | Stop reason: HOSPADM

## 2022-07-03 RX ORDER — ACETAMINOPHEN 325 MG/1
650 TABLET ORAL EVERY 6 HOURS PRN
Status: DISCONTINUED | OUTPATIENT
Start: 2022-07-03 | End: 2022-07-04 | Stop reason: HOSPADM

## 2022-07-03 RX ORDER — IBUPROFEN 200 MG
16 TABLET ORAL
Status: DISCONTINUED | OUTPATIENT
Start: 2022-07-03 | End: 2022-07-04 | Stop reason: HOSPADM

## 2022-07-03 RX ADMIN — HYDRALAZINE HYDROCHLORIDE 10 MG: 20 INJECTION, SOLUTION INTRAMUSCULAR; INTRAVENOUS at 07:07

## 2022-07-03 RX ADMIN — SENNOSIDES AND DOCUSATE SODIUM 1 TABLET: 50; 8.6 TABLET ORAL at 08:07

## 2022-07-03 RX ADMIN — LIDOCAINE 1 PATCH: 50 PATCH CUTANEOUS at 10:07

## 2022-07-03 RX ADMIN — ACETAMINOPHEN 650 MG: 325 TABLET ORAL at 08:07

## 2022-07-03 RX ADMIN — Medication 10 MG: at 06:07

## 2022-07-03 RX ADMIN — HYDRALAZINE HYDROCHLORIDE 10 MG: 20 INJECTION, SOLUTION INTRAMUSCULAR; INTRAVENOUS at 05:07

## 2022-07-03 RX ADMIN — CARVEDILOL 25 MG: 25 TABLET, FILM COATED ORAL at 08:07

## 2022-07-03 NOTE — ASSESSMENT & PLAN NOTE
tPA given at 2:01pm, end time 2:50pm  Symptoms resolved s/p tPA  Hold all AP/AC x24 hours  NCC admit for close monitoring

## 2022-07-03 NOTE — ASSESSMENT & PLAN NOTE
Pt reported to OSH with L leg and L arm heaviness, was treated with tpa for possible ischemic stroke. Tpa end time at 1450. NIHSS of 0 upon exam in ED.   -CT completed, shows no hyperdensities indicative of a bleed   -CTA completed, shows not LVO   -Lipid panel, and TSH labs within normal limits  - HbA1c labs pending  -Echo with PFO ordered   -possible MRI scheduled for 9AM tomorrow  -vascular neurology consulted   -PT/OT/SLP as appropriate  -hold ASA and sub q heparin until past 24 hr since end time of tpa

## 2022-07-03 NOTE — SUBJECTIVE & OBJECTIVE
Past Medical History:   Diagnosis Date    Hypertension      Past Surgical History:   Procedure Laterality Date    CHOLECYSTECTOMY       History reviewed. No pertinent family history.  Social History     Tobacco Use    Smoking status: Never Smoker    Smokeless tobacco: Never Used   Substance Use Topics    Alcohol use: No    Drug use: No     Review of patient's allergies indicates:  No Known Allergies    Medications: I have reviewed the current medication administration record.    (Not in a hospital admission)      Review of Systems   Constitutional:  Negative for fatigue.   HENT:  Negative for hearing loss and trouble swallowing.    Eyes:  Negative for visual disturbance.   Respiratory:  Negative for choking.    Cardiovascular:  Negative for palpitations.   Gastrointestinal:  Negative for nausea and vomiting.   Musculoskeletal:  Negative for gait problem.   Skin:  Negative for color change.   Neurological:  Positive for weakness and numbness. Negative for facial asymmetry, speech difficulty and headaches.   Psychiatric/Behavioral:  Negative for confusion.    All other systems reviewed and are negative.  Objective:     Vital Signs (Most Recent):  Temp: 98.2 °F (36.8 °C) (07/03/22 1647)  Pulse: 69 (07/03/22 1459)  Resp: 18 (07/03/22 1459)  BP: (!) 178/92 (07/03/22 1459)  SpO2: 100 % (07/03/22 1459)    Vital Signs Range (Last 24H):  Temp:  [98.2 °F (36.8 °C)-98.4 °F (36.9 °C)]   Pulse:  [61-78]   Resp:  [16-18]   BP: (163-178)/(79-92)   SpO2:  [96 %-100 %]     Physical Exam  Vitals and nursing note reviewed.   Constitutional:       General: He is not in acute distress.  HENT:      Head: Normocephalic.      Nose: Nose normal.   Eyes:      Extraocular Movements: Extraocular movements intact.      Conjunctiva/sclera: Conjunctivae normal.   Cardiovascular:      Rate and Rhythm: Normal rate.   Pulmonary:      Effort: Pulmonary effort is normal. No respiratory distress.   Abdominal:      General: There is no distension.    Musculoskeletal:         General: No deformity or signs of injury.      Cervical back: Normal range of motion. No rigidity.   Skin:     General: Skin is warm and dry.   Neurological:      Mental Status: He is alert and oriented to person, place, and time.      Cranial Nerves: No dysarthria or facial asymmetry.      Sensory: Sensation is intact.      Motor: Motor function is intact. No tremor or seizure activity.      Coordination: Coordination is intact.   Psychiatric:         Attention and Perception: Attention normal.         Behavior: Behavior is cooperative.       Neurological Exam:   LOC: alert  Attention Span: Good   Language: No aphasia  Articulation: No dysarthria  Orientation: Person, Place, Time   Visual Fields: Full  EOM (CN III, IV, VI): Full/intact  Facial Sensation (CN V): Normal  Facial Movement (CN VII): Symmetric facial expression    Motor: Arm left  Normal 5/5  Leg left  Normal 5/5  Arm right  Normal 5/5  Leg right Normal 5/5  Sensation: Intact to light touch, temperature and vibration      Laboratory:  CMP:   Recent Labs   Lab 07/03/22  1522   CALCIUM 8.8   ALBUMIN 3.9   PROT 6.6      K 4.2   CO2 24      BUN 22*   CREATININE 0.9   ALKPHOS 57   ALT 55*   AST 29   BILITOT 0.6     CBC:   Recent Labs   Lab 07/03/22  1522   WBC 6.18   RBC 4.52*   HGB 13.6*   HCT 40.8   *   MCV 90   MCH 30.1   MCHC 33.3     Lipid Panel:   Recent Labs   Lab 07/03/22  1522   CHOL 149   LDLCALC 79.2   HDL 43   TRIG 134     Coagulation:   Recent Labs   Lab 07/03/22  1343 07/03/22  1522   INR 1.0 1.1   APTT 29.1  --      Hgb A1C: No results for input(s): HGBA1C in the last 168 hours.  TSH:   Recent Labs   Lab 07/03/22  1522   TSH 0.514       Diagnostic Results:    Brain imaging:  MRI brain ordered    CTH @ OSH 7/3/22  No acute intracranial pathology      Vessel Imaging:  CTA @ OSH 7/3/22  1. Minimal atherosclerotic disease involving the distal right CCA and the carotid bulb without hemodynamically  significant stenosis.  2. The plaque extends into the proximal right ICA, resulting in approximately 20% stenosis.       Cardiac Evaluation:   TTE ordered

## 2022-07-03 NOTE — H&P
[facility]  Neurocritical Care  History & Physical    Admit Date: 7/3/2022  Service Date: 07/03/2022  Length of Stay: 0    Subjective:     Chief Complaint: Thrombotic stroke involving right middle cerebral artery    History of Present Illness: Elias Iverson  Is a 61 y.o. male with a hx of hyperlipidemia, hypertension, DM, and intermittent back pain treated with oxycodone as needed. Patient reported L leg heaviness that progressed to the L arm. He brought himself into the ED at [facility] once began to experience these symptoms. He states that he has never had these symptoms before and that he went to sleep the previous night with no symptoms. He reports waking up this morning without these symptoms, and that the L leg heaviness began around 11:30 AM. Patient was given tpa at OSH for possible ischemic stroke intervention, tpa was completed at 1450. During examination patient denies any heaviness in the L arm or leg and reports he feels back to his baseline.       Past Medical History:   Diagnosis Date    Hypertension      Past Surgical History:   Procedure Laterality Date    CHOLECYSTECTOMY        Current Facility-Administered Medications on File Prior to Encounter   Medication Dose Route Frequency Provider Last Rate Last Admin    [COMPLETED] ALTEPLASE IV BOLUS FROM VIAL 8.6 mg  0.09 mg/kg Intravenous Once Colby Tenorio  mL/hr at 07/03/22 1400 8.8 mg at 07/03/22 1400    [COMPLETED] iohexoL (OMNIPAQUE 350) injection 100 mL  100 mL Intravenous ONCE PRN Colby Tenorio MD   100 mL at 07/03/22 1428    [DISCONTINUED] 0.9%  NaCl infusion   Intravenous ED 1 Time Colby Tenorio MD        [COMPLETED] alteplase (ACtivase) injection 77.2 mg  0.81 mg/kg Intravenous Once Colby Tenorio MD 77.2 mL/hr at 07/03/22 1403 78.8 mg at 07/03/22 1403     Current Outpatient Medications on File Prior to Encounter   Medication Sig Dispense Refill    benazepriL (LOTENSIN) 20 MG tablet TK 1 T  PO  QD      carvediloL (COREG) 25 MG tablet TK 1 T PO BID      cetirizine (ZYRTEC) 10 MG tablet TK 1 T PO QD      cyclobenzaprine (FLEXERIL) 10 MG tablet Take 10 mg by mouth 3 (three) times daily as needed for Muscle spasms.      indomethacin (INDOCIN) 50 MG capsule TAKE 1 CAPSULE(50 MG) BY MOUTH TWICE DAILY WITH MEALS 60 capsule 2    methylPREDNISolone (MEDROL DOSEPACK) 4 mg tablet use as directed x 6 days 1 each 0    ondansetron (ZOFRAN-ODT) 4 MG TbDL Take 1 tablet (4 mg total) by mouth every 8 (eight) hours as needed. 14 tablet 1      Allergies: Patient has no known allergies.    History reviewed. No pertinent family history.  Social History     Tobacco Use    Smoking status: Never Smoker    Smokeless tobacco: Never Used   Substance Use Topics    Alcohol use: No    Drug use: No     Review of Systems   Constitutional: Negative.    HENT: Negative.     Eyes: Negative.    Respiratory: Negative.     Cardiovascular: Negative.    Gastrointestinal: Negative.    Endocrine: Negative.    Genitourinary: Negative.    Musculoskeletal:  Positive for back pain.   Skin: Negative.    Neurological: Negative.    Hematological: Negative.    Psychiatric/Behavioral: Negative.     Objective:     Vitals:    Pulse: 69  BP: (!) 178/92  Resp: 18  SpO2: 100 %  O2 Device (Oxygen Therapy): room air    Temp  Min: 98.4 °F (36.9 °C)  Max: 98.4 °F (36.9 °C)  Pulse  Min: 61  Max: 78  BP  Min: 163/79  Max: 178/92  MAP (mmHg)  Min: 114  Max: 114  Resp  Min: 16  Max: 18  SpO2  Min: 96 %  Max: 100 %    No intake/output data recorded.           Physical Exam  Constitutional:       Appearance: Normal appearance.   HENT:      Head: Normocephalic and atraumatic.      Nose: Nose normal.      Mouth/Throat:      Mouth: Mucous membranes are moist.   Eyes:      Extraocular Movements: Extraocular movements intact.      Pupils: Pupils are equal, round, and reactive to light.   Cardiovascular:      Rate and Rhythm: Normal rate and regular rhythm.       Pulses: Normal pulses.      Heart sounds: Normal heart sounds.   Pulmonary:      Effort: Pulmonary effort is normal.      Breath sounds: Normal breath sounds.   Abdominal:      General: Abdomen is flat. Bowel sounds are normal.      Palpations: Abdomen is soft.   Musculoskeletal:         General: Normal range of motion.      Cervical back: Normal range of motion.   Skin:     General: Skin is warm and dry.   Neurological:      General: No focal deficit present.      Mental Status: He is alert and oriented to person, place, and time.      Comments: Possible L arm pronator drift, but neuro exam grossly intact with NIHSS of 0      Today I personally reviewed pertinent medications, lines/drains/airways, imaging, cardiology results, laboratory results, microbiology results, notably:          Assessment/Plan:     Neuro  Acute ischemic right MCA stroke  Pt reported to OSH with L leg and L arm heaviness, was treated with tpa for possible ischemic stroke. Tpa end time at 1450. NIHSS of 0 upon exam in ED.   -CT completed, shows no hyperdensities indicative of a bleed   -CTA completed, shows not LVO   -Lipid panel, and TSH labs within normal limits  - HbA1c labs pending  -Echo with PFO ordered   -possible MRI scheduled for 9AM tomorrow  -vascular neurology consulted   -PT/OT/SLP as appropriate  -hold ASA and sub q heparin until past 24 hr since end time of tpa    Cardiac/Vascular  Essential hypertension  -Pt given tpa at outside hospital, BP goals are <180   -prn hydralizine, prn labetalol as appropriate       Mixed hyperlipidemia  -lipid panel within normal limits as of 07/03/2022  -started atorvastatin 40     Endocrine  Type 2 diabetes mellitus  -SSI          The patient is being Prophylaxed for:  Venous Thromboembolism with: Mechanical  Stress Ulcer with: None  Ventilator Pneumonia with: none    Activity Orders          Turn patient starting at 07/03 1800    Elevate HOB starting at 07/03 1616    Diet NPO: NPO starting at  07/03 1616        Full Code    Nette Long MD  Neurocritical Care  [facility]

## 2022-07-03 NOTE — ASSESSMENT & PLAN NOTE
Stroke risk factor  A1c pending    IP blood glucose goal 140-180  Takes DM med at home, patient unsure of name and not listed in chart     gradual onset/worsening

## 2022-07-03 NOTE — PLAN OF CARE
[facility] Care Plan    POC reviewed with Elias Iverson and family at 1800. Pt verbalized understanding. Questions and concerns addressed. No acute events today. Pt progressing toward goals. Will continue to monitor. See below and flowsheets for full assessment and VS info.     Pt received from ed - pt hypertensive, achieved goal of SBP <180 with hydralazine x 1 - NCC aware of pt arrival to unit. NIH 0 on admit. See flowsheets for documentation.        Is this a stroke patient? yes- Stroke booklet reviewed with patient, risk factors identified for patient and stroke booklet remains at bedside for ongoing education.     Neuro:  [place] Coma Scale  Best Eye Response: 4-->(E4) spontaneous  Best Motor Response: 6-->(M6) obeys commands  Best Verbal Response: 5-->(V5) oriented  [place] Coma Scale Score: 15  Pupil PERRLA: no     24 hr Temp:  [98 °F (36.7 °C)-98.4 °F (36.9 °C)]     CV:   Rhythm: sinus bradycardia  BP goals:   SBP < 180  MAP > 65    Resp:   O2 Device (Oxygen Therapy): room air       Plan: N/A    GI/:     Diet/Nutrition Received: NPO  Last Bowel Movement: 07/03/22     No intake or output data in the 24 hours ending 07/03/22 1803       Labs/Accuchecks:  Recent Labs   Lab 07/03/22  1522   WBC 6.18   RBC 4.52*   HGB 13.6*   HCT 40.8   *      Recent Labs   Lab 07/03/22  1522      K 4.2   CO2 24      BUN 22*   CREATININE 0.9   ALKPHOS 57   ALT 55*   AST 29   BILITOT 0.6      Recent Labs   Lab 07/03/22  1343 07/03/22  1522   INR 1.0 1.1   APTT 29.1  --       Recent Labs   Lab 07/03/22  1343   TROPONINI <0.012       Electrolytes: N/A - electrolytes WDL  Accuchecks: none    Gtts:      LDA/Wounds:  Lines/Drains/Airways       Peripheral Intravenous Line  Duration                  Peripheral IV - Single Lumen 07/03/22 1340 20 G Left Hand <1 day         Peripheral IV - Single Lumen 07/03/22 1405 20 G Right Wrist <1 day                  Wounds: No  Wound care consulted: No

## 2022-07-03 NOTE — ASSESSMENT & PLAN NOTE
61yoM with PMH HTN, HLD, DM that presented to OSH with acute onset L hemiparesis and paresthesia. LKN 12:30pm, initial NIH 3. CTH/CTA @ OSH unremarkable for intracranial hemorrhage or LVO. No contraindications to tPA, given at 2:01pm prior to transfer. Patient will be admitted to [facility] for post-tPA monitoring and further stroke workup.  Etiology at this time felt likely small vessel disease.    On arrival to C post-tPA, L hemiparesis and paresthesia completely resolved and patient back to baseline. Neuro exam nonfocal with NIH 0.       Antithrombotics for secondary stroke prevention: Antiplatelets: None: Hold all Antithrombotics x 24 hours after IV t-PA administration    Statins for secondary stroke prevention and hyperlipidemia, if present:   Statins: Atorvastatin- 40 mg daily    Aggressive risk factor modification: HTN, DM, HLD     Rehab efforts: The patient has been evaluated by a stroke team provider and the therapy needs have been fully considered based off the presenting complaints and exam findings. The following therapy evaluations are needed: PT evaluate and treat, OT evaluate and treat, SLP evaluate and treat    Diagnostics ordered/pending: HgbA1C to assess blood glucose levels, MRI head without contrast to assess brain parenchyma, TTE to assess cardiac function/status , TSH to assess thyroid function    VTE prophylaxis: Mechanical prophylaxis: Place SCDs  None: Reason for No Pharmacological VTE Prophylaxis: Holding x 24 hours s/p treatment with alteplase (tPA)    BP parameters: Infarct: Post tPA, SBP <180

## 2022-07-03 NOTE — ED PROVIDER NOTES
Encounter Date: 7/3/2022       History     Chief Complaint   Patient presents with    Stroke Transfer      Reported to [facility], L arm and L leg heaviness. TPA given, completed 1450. Pt states symptoms have resolved to tingling and sensation is returning.      HPI   Elias Iverson is a 61-year-old male with a history of hypertension who presents as a transfer from outside hospital for stroke evaluation.  Patient states that he began developing weakness to his left lower extremity that initially began as heaviness to his left lower extremity and then traveled to his left upper extremity.  This was associated with numbness, tingling that worsened.  He presented to [facility] where he was evaluated by tele stroke, and tPA was administered.  TPA was completed at 2:50 p.m. prior to arrival.  Upon arrival, his symptoms are improving.  He denies any recent fevers, chills, trauma, falls, nausea, vomiting, diarrhea, back pain, visual changes or any speech impediment.  He is alert and oriented, denies any hematochezia, melena, hemoptysis or dysuria.  Blood sugar stable.    Review of patient's allergies indicates:  No Known Allergies  Past Medical History:   Diagnosis Date    Hypertension      Past Surgical History:   Procedure Laterality Date    CHOLECYSTECTOMY       No family history on file.  Social History     Tobacco Use    Smoking status: Never Smoker    Smokeless tobacco: Never Used   Substance Use Topics    Alcohol use: No    Drug use: No     Review of Systems   Constitutional: Negative for chills and fever.   HENT: Negative for congestion and sore throat.    Eyes: Negative for photophobia and visual disturbance.   Respiratory: Negative for chest tightness and shortness of breath.    Cardiovascular: Negative for chest pain and palpitations.   Gastrointestinal: Negative for abdominal pain, nausea and vomiting.   Genitourinary: Negative for dysuria and flank pain.   Musculoskeletal:  Negative for arthralgias, back pain and neck stiffness.   Skin: Negative for color change and wound.   Neurological: Positive for weakness and numbness. Negative for syncope, facial asymmetry, speech difficulty and light-headedness.   Psychiatric/Behavioral: Negative for confusion, hallucinations and self-injury.       Physical Exam     Initial Vitals [07/03/22 1459]   BP Pulse Resp Temp SpO2   (!) 178/92 69 18 -- 100 %      MAP       --         Physical Exam    Nursing note and vitals reviewed.      Gen/Constitutional: Interactive. No acute distress  Head: Normocephalic, Atraumatic  Neck: supple, no masses or LAD, no JVD  Eyes: PERRLA, conjunctiva clear  Ears, Nose and Throat: No rhinorrhea or stridor.  Cardiac:  Regular rate, Reg Rhythm, No murmur  Pulmonary: CTA Bilat, no wheezes, rhonchi, rales.  No increased work of breathing.  GI: Abdomen soft, non-tender, non-distended; no rebound or guarding  : No CVA tenderness.  Musculoskeletal: Extremities warm, well perfused, no erythema, no edema  Skin: No rashes, cyanosis or jaundice.  Neuro: Alert and Oriented x 3; No focal motor or sensory deficits at this time, NIH scale 0.    Psych: Normal affect      ED Course   Critical Care    Date/Time: 7/3/2022 3:17 PM  Performed by: Juan Lyman DO  Authorized by: Juan Lyman DO   Direct patient critical care time: 15 minutes  Additional history critical care time: 15 minutes  Ordering / reviewing critical care time: 20 minutes  Documentation critical care time: 6 minutes  Consulting other physicians critical care time: 10 minutes  Total critical care time (exclusive of procedural time) : 66 minutes  Critical care was necessary to treat or prevent imminent or life-threatening deterioration of the following conditions: CNS failure or compromise.  Critical care was time spent personally by me on the following activities: blood draw for specimens, discussions with consultants, evaluation of patient's response to  treatment, examination of patient, obtaining history from patient or surrogate, ordering and performing treatments and interventions, ordering and review of laboratory studies, pulse oximetry, re-evaluation of patient's condition and review of old charts.        Labs Reviewed   CBC W/ AUTO DIFFERENTIAL - Abnormal; Notable for the following components:       Result Value    RBC 4.52 (*)     Hemoglobin 13.6 (*)     Platelets 130 (*)     Mono % 16.7 (*)     All other components within normal limits   COMPREHENSIVE METABOLIC PANEL - Abnormal; Notable for the following components:    BUN 22 (*)     ALT 55 (*)     All other components within normal limits   PROTIME-INR   TSH   LIPID PANEL   POCT GLUCOSE, HAND-HELD DEVICE   SARS-COV-2 RDRP GENE   ISTAT CREATININE          Imaging Results    None          Medications - No data to display  Medical Decision Making:   History:   I obtained history from: EMS provider.       <> Summary of History: Transfer from [facility] for stroke activation status post tPA  Old Medical Records: I decided to obtain old medical records.  Initial Assessment:   Elias Iverson is a 61-year-old male with a history of hypertension who presents as a transfer from outside hospital for stroke evaluation.   Differential Diagnosis:   Acute stroke, TIA, ischemic stroke, hemorrhagic stroke, ICH, encephalopathy  Independently Interpreted Test(s):   I have ordered and independently interpreted EKG Reading(s) - see prior notes  Clinical Tests:   Lab Tests: Ordered and Reviewed  Radiological Study: Reviewed  Medical Tests: Ordered and Reviewed  Other:   I have discussed this case with another health care provider.       <> Summary of the Discussion: Vascular neurology and neurocritical care    Currently afebrile, slightly hypertensive with a blood pressure 170/92, without tachycardia.  Physical exam findings unremarkable at this time as patient has an NIH scale 0. He initially had left-sided weakness  to lower and upper extremity associated with numbness and tingling which have now resolved.  Stroke activation upon arrival, he is status post tPA which completed 2:50 p.m..  Discussed case with vascular Neurology who evaluated the patient at bedside.  On their evaluation, patient also had NIH scale 0. Discussed case with Neuro Critical Care Team, they will admit to the neuro ICU for post tPA monitoring.  Continue neuro critical care monitoring, frequent neuro checks, labs, ECG and cardiac telemetry monitor including pulse oximetry.  GCS 15 throughout ED evaluation.  Patient then to neuro ICU in stable condition.  Please see critical care note for critical care time.    Complexity:  Critical care                    Clinical Impression:   Final diagnoses:  [I63.9] Stroke  [R53.1] Acute left-sided weakness (Primary)  [Z92.82] Status post administration of tPA (rtPA) in a different facility within the last 24 hours prior to admission to current facility          ED Disposition Condition    Admit             Juan Lyman DO, [facility]  Emergency Staff Physician   Dept of Emergency Medicine   [facility]  Spectralink: [id number]        Disclaimer: This note has been generated using voice-recognition software. There may be typographical errors that have been missed during proof-reading.       Juan Lyman DO  07/03/22 1959

## 2022-07-03 NOTE — HPI
61yoM with PMH HTN, HLD, DM that presented to OSH with acute onset L hemiparesis and paresthesia. LKN 12:30pm, approx. 1hr PTA. Denies associated headache, vision deficits, slurred speech, N/V, gait difficulty, or syncope. Evaluated by telestroke, initial NIH 3 and CTH unremarkable. Determined to be tPA candidate, given at 2:01pm. Patient transferred to [facility] for higher level care. On arrival, symptoms resolved and patient feels back to baseline. Neuro exam nonfocal with NIH 0. Patient will be admitted to [facility] for post-tPA monitoring and further stroke workup.

## 2022-07-03 NOTE — CONSULTS
[facility] - Neuro Critical Care  Vascular Neurology  Comprehensive Stroke Center  Consult Note    Inpatient consult to Vascular (Stroke) Neurology  Consult performed by: DIANA Hoffmann  Consult ordered by: Juan Lyman DO        Assessment/Plan:     Patient is a 61 y.o. year old male with:    * Thrombotic stroke involving right middle cerebral artery  61yoM with PMH HTN, HLD, DM that presented to OSH with acute onset L hemiparesis and paresthesia. LKN 12:30pm, initial NIH 3. CTH/CTA @ OSH unremarkable for intracranial hemorrhage or LVO. No contraindications to tPA, given at 2:01pm prior to transfer. Patient will be admitted to [facility] for post-tPA monitoring and further stroke workup.  Etiology at this time felt likely small vessel disease.    On arrival to C post-tPA, L hemiparesis and paresthesia completely resolved and patient back to baseline. Neuro exam nonfocal with NIH 0.       Antithrombotics for secondary stroke prevention: Antiplatelets: None: Hold all Antithrombotics x 24 hours after IV t-PA administration    Statins for secondary stroke prevention and hyperlipidemia, if present:   Statins: Atorvastatin- 40 mg daily    Aggressive risk factor modification: HTN, DM, HLD     Rehab efforts: The patient has been evaluated by a stroke team provider and the therapy needs have been fully considered based off the presenting complaints and exam findings. The following therapy evaluations are needed: PT evaluate and treat, OT evaluate and treat, SLP evaluate and treat    Diagnostics ordered/pending: HgbA1C to assess blood glucose levels, MRI head without contrast to assess brain parenchyma, TTE to assess cardiac function/status , TSH to assess thyroid function    VTE prophylaxis: Mechanical prophylaxis: Place SCDs  None: Reason for No Pharmacological VTE Prophylaxis: Holding x 24 hours s/p treatment with alteplase (tPA)    BP parameters: Infarct: Post tPA, SBP <180        Type 2 diabetes mellitus  Stroke risk  factor  A1c pending    IP blood glucose goal 140-180  Takes DM med at home, patient unsure of name and not listed in chart      S/P admn tPA in diff fac w/n last 24 hr bef adm to crnt fac  tPA given at 2:01pm, end time 2:50pm  Symptoms resolved s/p tPA  Hold all AP/AC x24 hours  NCC admit for close monitoring      Essential hypertension  Stroke risk factor  Goal SBP <180 post-tPA    BP on arrival 178/92  On HTN meds at home    Mixed hyperlipidemia  Stroke risk factor  LDL 79, goal <70    Atorvastatin 40mg          STROKE DOCUMENTATION     Acute Stroke Times   Last Known Normal Date: 07/03/22  Last Known Normal Time: 1230  Symptom Onset Date: 07/03/22  Symptom Onset Time: 1230  Stroke Team Called Date: 07/03/22  Stroke Team Called Time: 1501  Stroke Team Arrival Date: 07/03/22  Stroke Team Arrival Time: 1505  CT Interpretation Time: 1530  Alteplase Recommended: Yes  CTA Interpretation Time: 1530  Thrombectomy Recommended: No  Decision to Treat Time for Alteplase: 1345    NIH Scale:  1a. Level of Consciousness: 0-->Alert, keenly responsive  1b. LOC Questions: 0-->Answers both questions correctly  1c. LOC Commands: 0-->Performs both tasks correctly  2. Best Gaze: 0-->Normal  3. Visual: 0-->No visual loss  4. Facial Palsy: 0-->Normal symmetrical movements  5a. Motor Arm, Left: 0-->No drift, limb holds 90 (or 45) degrees for full 10 secs  5b. Motor Arm, Right: 0-->No drift, limb holds 90 (or 45) degrees for full 10 secs  6a. Motor Leg, Left: 0-->No drift, leg holds 30 degree position for full 5 secs  6b. Motor Leg, Right: 0-->No drift, leg holds 30 degree position for full 5 secs  7. Limb Ataxia: 0-->Absent  8. Sensory: 0-->Normal, no sensory loss  9. Best Language: 0-->No aphasia, normal  10. Dysarthria: 0-->Normal  11. Extinction and Inattention (formerly Neglect): 0-->No abnormality  Total (NIH Stroke Scale): 0    Modified Hamer Score: 0  Hubert Coma Scale:    ABCD2 Score:    [id number] Score:   HAS -BLED Score:    ICH Score:   Hunt & Lopez Classification:       Thrombolysis Candidate? Yes, given prior to arrival at outside hospital    Delays to Thrombolysis?  No    Interventional Revascularization Candidate?   Is the patient eligible for mechanical endovascular reperfusion (STEPHAN)?  No; No large vessel occlusion identified on imaging     Delays to Thrombectomy? Not Applicable    Hemorrhagic change of an Ischemic Stroke: Does this patient have an ischemic stroke with hemorrhagic changes? No     Subjective:     History of Present Illness:  61yoM with PMH HTN, HLD, DM that presented to OSH with acute onset L hemiparesis and paresthesia. LKN 12:30pm, approx. 1hr PTA. Denies associated headache, vision deficits, slurred speech, N/V, gait difficulty, or syncope. Evaluated by telestroke, initial NIH 3 and CTH unremarkable. Determined to be tPA candidate, given at 2:01pm. Patient transferred to [facility] for higher level care. On arrival, symptoms resolved and patient feels back to baseline. Neuro exam nonfocal with NIH 0. Patient will be admitted to [facility] for post-tPA monitoring and further stroke workup.          Past Medical History:   Diagnosis Date    Hypertension      Past Surgical History:   Procedure Laterality Date    CHOLECYSTECTOMY       History reviewed. No pertinent family history.  Social History     Tobacco Use    Smoking status: Never Smoker    Smokeless tobacco: Never Used   Substance Use Topics    Alcohol use: No    Drug use: No     Review of patient's allergies indicates:  No Known Allergies    Medications: I have reviewed the current medication administration record.    (Not in a hospital admission)      Review of Systems   Constitutional:  Negative for fatigue.   HENT:  Negative for hearing loss and trouble swallowing.    Eyes:  Negative for visual disturbance.   Respiratory:  Negative for choking.    Cardiovascular:  Negative for palpitations.   Gastrointestinal:  Negative for nausea and vomiting.    Musculoskeletal:  Negative for gait problem.   Skin:  Negative for color change.   Neurological:  Positive for weakness and numbness. Negative for facial asymmetry, speech difficulty and headaches.   Psychiatric/Behavioral:  Negative for confusion.    All other systems reviewed and are negative.  Objective:     Vital Signs (Most Recent):  Temp: 98.2 °F (36.8 °C) (07/03/22 1647)  Pulse: 69 (07/03/22 1459)  Resp: 18 (07/03/22 1459)  BP: (!) 178/92 (07/03/22 1459)  SpO2: 100 % (07/03/22 1459)    Vital Signs Range (Last 24H):  Temp:  [98.2 °F (36.8 °C)-98.4 °F (36.9 °C)]   Pulse:  [61-78]   Resp:  [16-18]   BP: (163-178)/(79-92)   SpO2:  [96 %-100 %]     Physical Exam  Vitals and nursing note reviewed.   Constitutional:       General: He is not in acute distress.  HENT:      Head: Normocephalic.      Nose: Nose normal.   Eyes:      Extraocular Movements: Extraocular movements intact.      Conjunctiva/sclera: Conjunctivae normal.   Cardiovascular:      Rate and Rhythm: Normal rate.   Pulmonary:      Effort: Pulmonary effort is normal. No respiratory distress.   Abdominal:      General: There is no distension.   Musculoskeletal:         General: No deformity or signs of injury.      Cervical back: Normal range of motion. No rigidity.   Skin:     General: Skin is warm and dry.   Neurological:      Mental Status: He is alert and oriented to person, place, and time.      Cranial Nerves: No dysarthria or facial asymmetry.      Sensory: Sensation is intact.      Motor: Motor function is intact. No tremor or seizure activity.      Coordination: Coordination is intact.   Psychiatric:         Attention and Perception: Attention normal.         Behavior: Behavior is cooperative.       Neurological Exam:   LOC: alert  Attention Span: Good   Language: No aphasia  Articulation: No dysarthria  Orientation: Person, Place, Time   Visual Fields: Full  EOM (CN III, IV, VI): Full/intact  Facial Sensation (CN V): Normal  Facial Movement  (CN VII): Symmetric facial expression    Motor: Arm left  Normal 5/5  Leg left  Normal 5/5  Arm right  Normal 5/5  Leg right Normal 5/5  Sensation: Intact to light touch, temperature and vibration      Laboratory:  CMP:   Recent Labs   Lab 07/03/22  1522   CALCIUM 8.8   ALBUMIN 3.9   PROT 6.6      K 4.2   CO2 24      BUN 22*   CREATININE 0.9   ALKPHOS 57   ALT 55*   AST 29   BILITOT 0.6     CBC:   Recent Labs   Lab 07/03/22  1522   WBC 6.18   RBC 4.52*   HGB 13.6*   HCT 40.8   *   MCV 90   MCH 30.1   MCHC 33.3     Lipid Panel:   Recent Labs   Lab 07/03/22  1522   CHOL 149   LDLCALC 79.2   HDL 43   TRIG 134     Coagulation:   Recent Labs   Lab 07/03/22  1343 07/03/22  1522   INR 1.0 1.1   APTT 29.1  --      Hgb A1C: No results for input(s): HGBA1C in the last 168 hours.  TSH:   Recent Labs   Lab 07/03/22  1522   TSH 0.514       Diagnostic Results:    Brain imaging:  MRI brain ordered    CTH @ OSH 7/3/22  No acute intracranial pathology      Vessel Imaging:  CTA @ OSH 7/3/22  1. Minimal atherosclerotic disease involving the distal right CCA and the carotid bulb without hemodynamically significant stenosis.  2. The plaque extends into the proximal right ICA, resulting in approximately 20% stenosis.       Cardiac Evaluation:   TTE ordered        DIANA Fernández  [facility]  Department of Vascular Neurology   [facility] - Neuro Critical Care

## 2022-07-03 NOTE — HPI
Elias Iverson  Is a 61 y.o. male with a hx of hyperlipidemia, hypertension, DM, and intermittent back pain treated with oxycodone as needed. Patient reported L leg heaviness that progressed to the L arm. He brought himself into the ED at [facility] once began to experience these symptoms. He states that he has never had these symptoms before and that he went to sleep the previous night with no symptoms. He reports waking up this morning without these symptoms, and that the L leg heaviness began around 11:30 AM. Patient was given tpa at OSH for possible ischemic stroke intervention, tpa was completed at 1450. During examination patient denies any heaviness in the L arm or leg and reports he feels back to his baseline.

## 2022-07-03 NOTE — PROGRESS NOTES
Patient arrived to [facility] from [facility] ED    Type of stroke/diagnosis:  R MCA    TPA start and end time: end 1450    Thrombectomy start and end time (if applicable) : n/a    Current symptoms: unequal pupils, pt states this is baseline    Skin assessment done: Yes  Wounds noted: none    *If wounds noted, was Wound Care consulted? n/a    Styles Completed? pending    Patient Belongings on Admit: jeans, socks, underwear, wallet    NCC notified: DIANA Mckeon

## 2022-07-03 NOTE — ASSESSMENT & PLAN NOTE
-Pt given tpa at outside hospital, BP goals are <180   -prn hydralizine, prn labetalol as appropriate

## 2022-07-03 NOTE — ED NOTES
"Pt presents to ED as a transfer from [facility]. Pt is AAOx4. RR is even, unlabored, and spontaneous. Skin is warm, dry and intact. Pt went to the hospital around ~noon today (7/3/22) d/t upper left extremity "heaviness." Pt was given TPA and it finished en route in EMS around ~1450. Pt currently denying symptoms. Denies numbness and tingling. Family members at bedside. Pt placed on cardiac monitor, BP cuff, and pulse ox. Bed low and locked; side rails up x2; call light within reach. Will continue to monitor.    Patient identifiers for Elias Iverson 61 y.o. male checked and correct.  Chief Complaint   Patient presents with    Stroke Transfer      Reported to [facility], L arm and L leg heaviness. TPA given, completed 1450. Pt states symptoms have resolved to tingling and sensation is returning.      Past Medical History:   Diagnosis Date    Hypertension      Allergies reported: Review of patient's allergies indicates:  No Known Allergies  "

## 2022-07-03 NOTE — SUBJECTIVE & OBJECTIVE
Past Medical History:   Diagnosis Date    Hypertension      Past Surgical History:   Procedure Laterality Date    CHOLECYSTECTOMY        Current Facility-Administered Medications on File Prior to Encounter   Medication Dose Route Frequency Provider Last Rate Last Admin    [COMPLETED] ALTEPLASE IV BOLUS FROM VIAL 8.6 mg  0.09 mg/kg Intravenous Once Colby Tenorio  mL/hr at 07/03/22 1400 8.8 mg at 07/03/22 1400    [COMPLETED] iohexoL (OMNIPAQUE 350) injection 100 mL  100 mL Intravenous ONCE PRN Colby Tenorio MD   100 mL at 07/03/22 1428    [DISCONTINUED] 0.9%  NaCl infusion   Intravenous ED 1 Time Colby Tenorio MD        [COMPLETED] alteplase (ACtivase) injection 77.2 mg  0.81 mg/kg Intravenous Once Colby Tenorio MD 77.2 mL/hr at 07/03/22 1403 78.8 mg at 07/03/22 1403     Current Outpatient Medications on File Prior to Encounter   Medication Sig Dispense Refill    benazepriL (LOTENSIN) 20 MG tablet TK 1 T PO  QD      carvediloL (COREG) 25 MG tablet TK 1 T PO BID      cetirizine (ZYRTEC) 10 MG tablet TK 1 T PO QD      cyclobenzaprine (FLEXERIL) 10 MG tablet Take 10 mg by mouth 3 (three) times daily as needed for Muscle spasms.      indomethacin (INDOCIN) 50 MG capsule TAKE 1 CAPSULE(50 MG) BY MOUTH TWICE DAILY WITH MEALS 60 capsule 2    methylPREDNISolone (MEDROL DOSEPACK) 4 mg tablet use as directed x 6 days 1 each 0    ondansetron (ZOFRAN-ODT) 4 MG TbDL Take 1 tablet (4 mg total) by mouth every 8 (eight) hours as needed. 14 tablet 1      Allergies: Patient has no known allergies.    History reviewed. No pertinent family history.  Social History     Tobacco Use    Smoking status: Never Smoker    Smokeless tobacco: Never Used   Substance Use Topics    Alcohol use: No    Drug use: No     Review of Systems   Constitutional: Negative.    HENT: Negative.     Eyes: Negative.    Respiratory: Negative.     Cardiovascular: Negative.    Gastrointestinal: Negative.    Endocrine: Negative.    Genitourinary:  Negative.    Musculoskeletal:  Positive for back pain.   Skin: Negative.    Neurological: Negative.    Hematological: Negative.    Psychiatric/Behavioral: Negative.     Objective:     Vitals:    Pulse: 69  BP: (!) 178/92  Resp: 18  SpO2: 100 %  O2 Device (Oxygen Therapy): room air    Temp  Min: 98.4 °F (36.9 °C)  Max: 98.4 °F (36.9 °C)  Pulse  Min: 61  Max: 78  BP  Min: 163/79  Max: 178/92  MAP (mmHg)  Min: 114  Max: 114  Resp  Min: 16  Max: 18  SpO2  Min: 96 %  Max: 100 %    No intake/output data recorded.           Physical Exam  Constitutional:       Appearance: Normal appearance.   HENT:      Head: Normocephalic and atraumatic.      Nose: Nose normal.      Mouth/Throat:      Mouth: Mucous membranes are moist.   Eyes:      Extraocular Movements: Extraocular movements intact.      Pupils: Pupils are equal, round, and reactive to light.   Cardiovascular:      Rate and Rhythm: Normal rate and regular rhythm.      Pulses: Normal pulses.      Heart sounds: Normal heart sounds.   Pulmonary:      Effort: Pulmonary effort is normal.      Breath sounds: Normal breath sounds.   Abdominal:      General: Abdomen is flat. Bowel sounds are normal.      Palpations: Abdomen is soft.   Musculoskeletal:         General: Normal range of motion.      Cervical back: Normal range of motion.   Skin:     General: Skin is warm and dry.   Neurological:      General: No focal deficit present.      Mental Status: He is alert and oriented to person, place, and time.      Comments: Possible L arm pronator drift, but neuro exam grossly intact with NIHSS of 0      Today I personally reviewed pertinent medications, lines/drains/airways, imaging, cardiology results, laboratory results, microbiology results, notably:

## 2022-07-03 NOTE — ASSESSMENT & PLAN NOTE
Pt reported to OSH with L leg and L arm heaviness, was treated with tpa for possible ischemic stroke. Tpa end time at 1450. NIHSS of 0 upon exam in ED.   -CT completed, shows no hyperdensities indicative of a bleed   -CTA completed, shows not LVO   -Lipid panel, and TSH labs within normal limits  - HbA1c labs pending  -Echo with PFO ordered   -possible MRI scheduled for 9AM tomorrow  -vascular neurology consulted   -PT/OT/SLP as appropriate

## 2022-07-04 VITALS
HEART RATE: 57 BPM | OXYGEN SATURATION: 100 % | RESPIRATION RATE: 14 BRPM | DIASTOLIC BLOOD PRESSURE: 68 MMHG | HEIGHT: 72 IN | WEIGHT: 204.56 LBS | BODY MASS INDEX: 27.71 KG/M2 | SYSTOLIC BLOOD PRESSURE: 142 MMHG | TEMPERATURE: 98 F

## 2022-07-04 LAB
ALBUMIN SERPL BCP-MCNC: 3.7 G/DL (ref 3.5–5.2)
ALP SERPL-CCNC: 60 U/L (ref 55–135)
ALT SERPL W/O P-5'-P-CCNC: 42 U/L (ref 10–44)
ANION GAP SERPL CALC-SCNC: 7 MMOL/L (ref 8–16)
AST SERPL-CCNC: 22 U/L (ref 10–40)
BASOPHILS # BLD AUTO: 0.03 K/UL (ref 0–0.2)
BASOPHILS NFR BLD: 0.5 % (ref 0–1.9)
BILIRUB SERPL-MCNC: 1.1 MG/DL (ref 0.1–1)
BUN SERPL-MCNC: 16 MG/DL (ref 6–20)
CALCIUM SERPL-MCNC: 8.9 MG/DL (ref 8.7–10.5)
CHLORIDE SERPL-SCNC: 106 MMOL/L (ref 95–110)
CO2 SERPL-SCNC: 25 MMOL/L (ref 23–29)
CREAT SERPL-MCNC: 0.8 MG/DL (ref 0.5–1.4)
DIFFERENTIAL METHOD: ABNORMAL
EOSINOPHIL # BLD AUTO: 0.1 K/UL (ref 0–0.5)
EOSINOPHIL NFR BLD: 2.5 % (ref 0–8)
ERYTHROCYTE [DISTWIDTH] IN BLOOD BY AUTOMATED COUNT: 12.7 % (ref 11.5–14.5)
EST. GFR  (AFRICAN AMERICAN): >60 ML/MIN/1.73 M^2
EST. GFR  (NON AFRICAN AMERICAN): >60 ML/MIN/1.73 M^2
GLUCOSE SERPL-MCNC: 93 MG/DL (ref 70–110)
HCT VFR BLD AUTO: 39.2 % (ref 40–54)
HGB BLD-MCNC: 13 G/DL (ref 14–18)
IMM GRANULOCYTES # BLD AUTO: 0.02 K/UL (ref 0–0.04)
IMM GRANULOCYTES NFR BLD AUTO: 0.4 % (ref 0–0.5)
LYMPHOCYTES # BLD AUTO: 1.7 K/UL (ref 1–4.8)
LYMPHOCYTES NFR BLD: 30.7 % (ref 18–48)
MAGNESIUM SERPL-MCNC: 2.1 MG/DL (ref 1.6–2.6)
MCH RBC QN AUTO: 30 PG (ref 27–31)
MCHC RBC AUTO-ENTMCNC: 33.2 G/DL (ref 32–36)
MCV RBC AUTO: 91 FL (ref 82–98)
MONOCYTES # BLD AUTO: 0.9 K/UL (ref 0.3–1)
MONOCYTES NFR BLD: 15.2 % (ref 4–15)
NEUTROPHILS # BLD AUTO: 2.9 K/UL (ref 1.8–7.7)
NEUTROPHILS NFR BLD: 50.7 % (ref 38–73)
NRBC BLD-RTO: 0 /100 WBC
PHOSPHATE SERPL-MCNC: 4.2 MG/DL (ref 2.7–4.5)
PLATELET # BLD AUTO: 121 K/UL (ref 150–450)
PMV BLD AUTO: 12.7 FL (ref 9.2–12.9)
POCT GLUCOSE: 106 MG/DL (ref 70–110)
POCT GLUCOSE: 131 MG/DL (ref 70–110)
POCT GLUCOSE: 149 MG/DL (ref 70–110)
POTASSIUM SERPL-SCNC: 3.6 MMOL/L (ref 3.5–5.1)
PROT SERPL-MCNC: 6.2 G/DL (ref 6–8.4)
RBC # BLD AUTO: 4.33 M/UL (ref 4.6–6.2)
SODIUM SERPL-SCNC: 138 MMOL/L (ref 136–145)
WBC # BLD AUTO: 5.67 K/UL (ref 3.9–12.7)

## 2022-07-04 PROCEDURE — 96372 THER/PROPH/DIAG INJ SC/IM: CPT

## 2022-07-04 PROCEDURE — 97535 SELF CARE MNGMENT TRAINING: CPT

## 2022-07-04 PROCEDURE — 83735 ASSAY OF MAGNESIUM: CPT

## 2022-07-04 PROCEDURE — 99233 PR SUBSEQUENT HOSPITAL CARE,LEVL III: ICD-10-PCS | Mod: ,,, | Performed by: PSYCHIATRY & NEUROLOGY

## 2022-07-04 PROCEDURE — 85025 COMPLETE CBC W/AUTO DIFF WBC: CPT

## 2022-07-04 PROCEDURE — 92523 SPEECH SOUND LANG COMPREHEN: CPT

## 2022-07-04 PROCEDURE — 92610 EVALUATE SWALLOWING FUNCTION: CPT

## 2022-07-04 PROCEDURE — 94761 N-INVAS EAR/PLS OXIMETRY MLT: CPT

## 2022-07-04 PROCEDURE — 97161 PT EVAL LOW COMPLEX 20 MIN: CPT

## 2022-07-04 PROCEDURE — 25000003 PHARM REV CODE 250

## 2022-07-04 PROCEDURE — G0378 HOSPITAL OBSERVATION PER HR: HCPCS

## 2022-07-04 PROCEDURE — 63600175 PHARM REV CODE 636 W HCPCS

## 2022-07-04 PROCEDURE — 80053 COMPREHEN METABOLIC PANEL: CPT

## 2022-07-04 PROCEDURE — 99233 SBSQ HOSP IP/OBS HIGH 50: CPT | Mod: ,,, | Performed by: PSYCHIATRY & NEUROLOGY

## 2022-07-04 PROCEDURE — 96376 TX/PRO/DX INJ SAME DRUG ADON: CPT

## 2022-07-04 PROCEDURE — 84100 ASSAY OF PHOSPHORUS: CPT

## 2022-07-04 PROCEDURE — 97165 OT EVAL LOW COMPLEX 30 MIN: CPT

## 2022-07-04 RX ORDER — ATORVASTATIN CALCIUM 20 MG/1
20 TABLET, FILM COATED ORAL NIGHTLY
Status: ON HOLD | COMMUNITY
Start: 2022-05-02 | End: 2022-07-04 | Stop reason: HOSPADM

## 2022-07-04 RX ORDER — LOSARTAN POTASSIUM 25 MG/1
25 TABLET ORAL DAILY
Status: DISCONTINUED | OUTPATIENT
Start: 2022-07-04 | End: 2022-07-04 | Stop reason: HOSPADM

## 2022-07-04 RX ORDER — GLIPIZIDE 5 MG/1
5 TABLET, FILM COATED, EXTENDED RELEASE ORAL DAILY
COMMUNITY
Start: 2022-05-18

## 2022-07-04 RX ORDER — LISINOPRIL 20 MG/1
20 TABLET ORAL DAILY
Status: DISCONTINUED | OUTPATIENT
Start: 2022-07-04 | End: 2022-07-04

## 2022-07-04 RX ORDER — NAPROXEN SODIUM 220 MG/1
81 TABLET, FILM COATED ORAL DAILY
Status: DISCONTINUED | OUTPATIENT
Start: 2022-07-04 | End: 2022-07-04 | Stop reason: HOSPADM

## 2022-07-04 RX ORDER — CLOPIDOGREL BISULFATE 75 MG/1
75 TABLET ORAL DAILY
Status: DISCONTINUED | OUTPATIENT
Start: 2022-07-04 | End: 2022-07-04 | Stop reason: HOSPADM

## 2022-07-04 RX ORDER — ATORVASTATIN CALCIUM 40 MG/1
40 TABLET, FILM COATED ORAL DAILY
Qty: 90 TABLET | Refills: 3 | Status: SHIPPED | OUTPATIENT
Start: 2022-07-05 | End: 2023-07-05

## 2022-07-04 RX ORDER — NAPROXEN SODIUM 220 MG/1
81 TABLET, FILM COATED ORAL DAILY
Qty: 30 TABLET | Refills: 3 | Status: SHIPPED | OUTPATIENT
Start: 2022-07-05 | End: 2023-07-05

## 2022-07-04 RX ORDER — CLOPIDOGREL BISULFATE 75 MG/1
75 TABLET ORAL DAILY
Qty: 30 TABLET | Refills: 0 | Status: SHIPPED | OUTPATIENT
Start: 2022-07-05 | End: 2023-07-05

## 2022-07-04 RX ORDER — LOSARTAN POTASSIUM 25 MG/1
25 TABLET ORAL DAILY
Qty: 90 TABLET | Refills: 3 | Status: SHIPPED | OUTPATIENT
Start: 2022-07-05 | End: 2023-07-05

## 2022-07-04 RX ORDER — HYDROCODONE BITARTRATE AND ACETAMINOPHEN 5; 325 MG/1; MG/1
1 TABLET ORAL 2 TIMES DAILY PRN
COMMUNITY
Start: 2022-07-01

## 2022-07-04 RX ORDER — ENOXAPARIN SODIUM 100 MG/ML
40 INJECTION SUBCUTANEOUS EVERY 24 HOURS
Status: DISCONTINUED | OUTPATIENT
Start: 2022-07-04 | End: 2022-07-04 | Stop reason: HOSPADM

## 2022-07-04 RX ADMIN — HYDRALAZINE HYDROCHLORIDE 10 MG: 20 INJECTION, SOLUTION INTRAMUSCULAR; INTRAVENOUS at 08:07

## 2022-07-04 RX ADMIN — ENOXAPARIN SODIUM 40 MG: 100 INJECTION SUBCUTANEOUS at 04:07

## 2022-07-04 RX ADMIN — ATORVASTATIN CALCIUM 40 MG: 20 TABLET, FILM COATED ORAL at 08:07

## 2022-07-04 RX ADMIN — LOSARTAN POTASSIUM 25 MG: 25 TABLET, FILM COATED ORAL at 12:07

## 2022-07-04 RX ADMIN — CARVEDILOL 25 MG: 25 TABLET, FILM COATED ORAL at 08:07

## 2022-07-04 RX ADMIN — SENNOSIDES AND DOCUSATE SODIUM 1 TABLET: 50; 8.6 TABLET ORAL at 08:07

## 2022-07-04 RX ADMIN — CLOPIDOGREL 75 MG: 75 TABLET, FILM COATED ORAL at 03:07

## 2022-07-04 RX ADMIN — ASPIRIN 81 MG CHEWABLE TABLET 81 MG: 81 TABLET CHEWABLE at 03:07

## 2022-07-04 RX ADMIN — ACETAMINOPHEN 650 MG: 325 TABLET ORAL at 05:07

## 2022-07-04 NOTE — CONSULTS
Inpatient consult to Physical Medicine Rehab  Consult performed by: Salome Garcia NP  Consult ordered by: Nette Long MD  Reason for consult: Assess rehab needs      Reviewed patient history and current admission.  Rehab team following.  Full consult to follow once closer to medical stability and able to participate with therapy.    GUILHERME Mesa, FNP-C  Physical Medicine & Rehabilitation   07/04/2022

## 2022-07-04 NOTE — ASSESSMENT & PLAN NOTE
61yoM with PMH HTN, HLD, DM that presented to OSH with acute onset L hemiparesis and paresthesia. LKN 12:30pm, initial NIH 3. CTH/CTA @ OSH unremarkable for intracranial hemorrhage or LVO. No contraindications to tPA, given at 2:01pm prior to transfer. Patient will be admitted to [facility] for post-tPA monitoring and further stroke workup.  Etiology at this time felt likely small vessel disease.    Patient in [facility] s/p tPA. Patient NIHSS 0 with stable neuro exam, plans for discharge home today. MRI negative for evidence of acute intracranial process, specifically no evidence of acute infarct. Patient will need TTE post discharge. Patient will need to be discharged on DAPT for 30 days. No outpatient or inpatient therapy needs post-discharge.     Antithrombotics for secondary stroke prevention: Antiplatelets: Aspirin: 81 mg daily  Clopidogrel: 75 mg dailyx 30 days    Statins for secondary stroke prevention and hyperlipidemia, if present:   Statins: Atorvastatin- 40 mg daily    Aggressive risk factor modification: HTN, DM, HLD     Rehab efforts: The patient has been evaluated by a stroke team provider and the therapy needs have been fully considered based off the presenting complaints and exam findings. The following therapy evaluations are needed: PT evaluate and treat, OT evaluate and treat, SLP evaluate and treat    Diagnostics ordered/pending: HgbA1C to assess blood glucose levels, MRI head without contrast to assess brain parenchyma, TTE to assess cardiac function/status , TSH to assess thyroid function    VTE prophylaxis: Heparin 5000 units SQ every 8 hours  Mechanical prophylaxis: Place SCDs    BP parameters: Infarct: Post tPA, SBP <180

## 2022-07-04 NOTE — DISCHARGE SUMMARY
[facility] - Neuro Critical Care  Vascular Neurology  Comprehensive Stroke Center  Discharge Summary     Summary:     Admit Date: 7/3/2022  3:01 PM    Discharge Date and Time:  07/04/2022 4:58 PM    Attending Physician: Katlin Rodriguez MD     Discharge Provider: Sarah Portillo PA-C    History of Present Illness: 61yoM with PMH HTN, HLD, DM that presented to OSH with acute onset L hemiparesis and paresthesia. LKN 12:30pm, approx. 1hr PTA. Denies associated headache, vision deficits, slurred speech, N/V, gait difficulty, or syncope. Evaluated by telestroke, initial NIH 3 and CTH unremarkable. Determined to be tPA candidate, given at 2:01pm. Patient transferred to [facility] for higher level care. On arrival, symptoms resolved and patient feels back to baseline. Neuro exam nonfocal with NIH 0. Patient will be admitted to [facility] for post-tPA monitoring and further stroke workup.      Hospital Course (synopsis of major diagnoses, care, treatment, and services provided during the course of the hospital stay): Mr. Iverson is a 62 yo male with PMH HTN, HLD, DM that presented to OSH with acute onset L hemiparesis and paresthesia on 7/3. LKN 12:30pm, approximately 1hr PTA. Denies associated headache, vision deficits, slurred speech, N/V, gait difficulty, or syncope. Evaluated by telestroke, initial NIH 3 and CTH unremarkable. Determined to be tPA candidate, given at 2:01pm. Patient transferred to [facility] for higher level care. On arrival, symptoms resolved and patient feels back to baseline. Neuro exam nonfocal with NIH 0. Patient was admitted to [facility] for higher level of care and post-tPA monitoring. MRI completed and without evidence of acute infarct. Etiology ESUS. Given patient presumed ESUS etiology, he will be discharged with 30 day cardiac event monitor.     On day of discharge, NIHSS 0, patient reports feeling back to baseline and denies any deficits. Patient will be sent home on DAPT for 30 days, then ASA indefinitely for  secondary stroke prevention. Patient also going home with Atorvastatin 40 MG. TTE was not completed in the duration of inpatient stay, therefore patient will be discharged with orders for outpatient TTE. Patient was evaluated by PT/OT/SLP and is without any needs for followup with therapy. Patient to follow up with PCP within 1 week and vascular neurology in 4-6 weeks.     For a more detailed summary of hospital course, please see below.    07/04/2022 Patient in [facility] s/p tPA. Patient NIHSS 0 with stable neuro exam, plans for discharge home today. MRI negative for evidence of acute intracranial process, specifically no evidence of acute infarct. Patient will need TTE post discharge. Patient will need to be discharged on DAPT for 30 days. No outpatient or inpatient therapy needs post-discharge.       Goals of Care Treatment Preferences:  Code Status: Full Code      Stroke Etiology: Ischemic Undetermined Cause Cryptogenic Embolism / ESUS (Embolic Stroke of Undetermined Source)    STROKE DOCUMENTATION   Acute Stroke Times   Last Known Normal Date: 07/03/22  Last Known Normal Time: 1230  Symptom Onset Date: 07/03/22  Symptom Onset Time: 1230  Stroke Team Called Date: 07/03/22  Stroke Team Called Time: 1501  Stroke Team Arrival Date: 07/03/22  Stroke Team Arrival Time: 1505  CT Interpretation Time: 1530  Alteplase Recommended: Yes  CTA Interpretation Time: 1530  Thrombectomy Recommended: No  Decision to Treat Time for Alteplase: 1345     NIH Scale:  1a. Level of Consciousness: 0-->Alert, keenly responsive  1b. LOC Questions: 0-->Answers both questions correctly  1c. LOC Commands: 0-->Performs both tasks correctly  2. Best Gaze: 0-->Normal  3. Visual: 0-->No visual loss  4. Facial Palsy: 0-->Normal symmetrical movements  5a. Motor Arm, Left: 0-->No drift, limb holds 90 (or 45) degrees for full 10 secs  5b. Motor Arm, Right: 0-->No drift, limb holds 90 (or 45) degrees for full 10 secs  6a. Motor Leg, Left: 0-->No drift, leg  holds 30 degree position for full 5 secs  6b. Motor Leg, Right: 0-->No drift, leg holds 30 degree position for full 5 secs  7. Limb Ataxia: 0-->Absent  8. Sensory: 0-->Normal, no sensory loss  9. Best Language: 0-->No aphasia, normal  10. Dysarthria: 0-->Normal  11. Extinction and Inattention (formerly Neglect): 0-->No abnormality  Total (NIH Stroke Scale): 0        Modified [place] Score: 0  [place] Coma Scale:    ABCD2 Score:    [id number] Score:   HAS -BLED Score:   ICH Score:   Hunt & Lopez Classification:       Assessment/Plan:     Diagnostic Results:    Brain imaging:  MRI Brain 7/4/22  No acute intracranial process with specifically no evidence of acute infarct.      CTH @ OSH 7/3/22  No acute intracranial pathology     Vessel Imaging:  CTA @ OSH 7/3/22  1. Minimal atherosclerotic disease involving the distal right CCA and the carotid bulb without hemodynamically significant stenosis.  2. The plaque extends into the proximal right ICA, resulting in approximately 20% stenosis.      Cardiac Evaluation:   TTE ordered, will need to be completed outpatient.    Interventions: IV t-PA    Complications: None    Disposition: Home or Self Care    Final Active Diagnoses:    Diagnosis Date Noted POA    PRINCIPAL PROBLEM:  Thrombotic stroke involving right middle cerebral artery [I63.311] 07/03/2022 Yes    Mixed hyperlipidemia [E78.2] 07/03/2022 Unknown    Essential hypertension [I10] 07/03/2022 Unknown    Type 2 diabetes mellitus [E11.9] 07/03/2022 Yes    Acute ischemic right MCA stroke [I63.511] 07/03/2022 Yes      Problems Resolved During this Admission:    Diagnosis Date Noted Date Resolved POA    S/P admn tPA in diff fac w/n last 24 hr bef adm to crnt fac [Z92.82] 07/03/2022 07/03/2022 Not Applicable     * Thrombotic stroke involving right middle cerebral artery  61yoM with PMH HTN, HLD, DM that presented to OSH with acute onset L hemiparesis and paresthesia. LKN 12:30pm, initial NIH 3. CTH/CTA @ OSH  unremarkable for intracranial hemorrhage or LVO. No contraindications to tPA, given at 2:01pm prior to transfer. Patient will be admitted to [facility] for post-tPA monitoring and further stroke workup.  Etiology at this time felt likely small vessel disease.    On day of discharge, NIHSS 0, patient reports feeling back to baseline and denies any deficits. Patient will be sent home on DAPT for 30 days, then ASA indefinitely for secondary stroke prevention. Patient also going home with Atorvastatin 40 MG. TTE was not completed in the duration of inpatient stay, therefore patient will be discharged with orders for outpatient TTE. Patient was evaluated by PT/OT/SLP and is without any needs for followup with therapy. Patient to follow up with PCP within 1 week and vascular neurology in 4-6 weeks.     Antithrombotics for secondary stroke prevention: Antiplatelets: Aspirin: 81 mg daily  Clopidogrel: 75 mg dailyx 30 days    Statins for secondary stroke prevention and hyperlipidemia, if present:   Statins: Atorvastatin- 40 mg daily    Aggressive risk factor modification: HTN, DM, HLD     Rehab efforts: No therapy needs    BP parameters: Normotensive            Recommendations:     Post-discharge complication risks: None    Stroke Education given to: patient and family    Follow-up in Stroke Clinic in 4-6 weeks.     Discharge Plan:  Antithrombotics: Aspirin 81 mg, Clopidogrel 75mg  Statin: Atorvastatin 40mg  Aggresive risk factor modification:  Hypertension  Diabetes  High Cholesterol  Diet  Exercise    Follow Up:      Patient Instructions:      Ambulatory referral/consult to Vascular Neurology   Standing Status: Future   Referral Priority: Routine Referral Type: Consultation   Referral Reason: Specialty Services Required   Requested Specialty: Vascular Neurology     Diet Cardiac   Order Comments: See Stroke Patient Education Guide Booklet for details.     Call 911 for any of the following:   Order Comments: Call 911  right away if  any of the following warning signs come on suddenly, even if the symptoms only last for a few minutes. With stroke, timing is very important.   - Warning Signs of Stroke:  - Weakness: You may feel a sudden weakness, tingling or loss of feeling on one side of your face or body.  - Vision Problems: You may have sudden double vision or trouble seeing in one or both eyes.  - Speech Problems: You may have sudden trouble talking, slured speech, or problems understanding others.  - Headache: You may have sudden, severe headache.  - Movement Problems: You may experience dizziness, a feeling of spinning, a loss of balance, a feeling of falling or blackouts.     Cardiac event monitor   Standing Status: Future Standing Exp. Date: 07/04/23     Order Specific Question Answer Comments   Cardiac Event Monitor Auto Trigger    Release to patient Immediate      Echo   Standing Status: Future Standing Exp. Date: 07/04/23     Order Specific Question Answer Comments   Release to patient Immediate        Medications:  Reconciled Home Medications:      Medication List      START taking these medications    aspirin 81 MG Chew  Take 1 tablet (81 mg total) by mouth once daily.  Start taking on: July 5, 2022     clopidogreL 75 mg tablet  Commonly known as: PLAVIX  Take 1 tablet (75 mg total) by mouth once daily.  Start taking on: July 5, 2022     losartan 25 MG tablet  Commonly known as: COZAAR  Take 1 tablet (25 mg total) by mouth once daily.  Start taking on: July 5, 2022        CHANGE how you take these medications    atorvastatin 40 MG tablet  Commonly known as: LIPITOR  Take 1 tablet (40 mg total) by mouth once daily.  Start taking on: July 5, 2022  What changed:   · medication strength  · how much to take  · when to take this        CONTINUE taking these medications    carvediloL 25 MG tablet  Commonly known as: COREG  TK 1 T PO BID     cetirizine 10 MG tablet  Commonly known as: ZYRTEC  TK 1 T PO QD     cyclobenzaprine 10 MG  tablet  Commonly known as: FLEXERIL  Take 10 mg by mouth 3 (three) times daily as needed for Muscle spasms.     glipiZIDE 5 MG Tr24  Commonly known as: GLUCOTROL  Take 5 mg by mouth once daily.     HYDROcodone-acetaminophen 5-325 mg per tablet  Commonly known as: NORCO  Take 1 tablet by mouth 2 (two) times daily as needed.     indomethacin 50 MG capsule  Commonly known as: INDOCIN  TAKE 1 CAPSULE(50 MG) BY MOUTH TWICE DAILY WITH MEALS     ondansetron 4 MG Tbdl  Commonly known as: ZOFRAN-ODT  Take 1 tablet (4 mg total) by mouth every 8 (eight) hours as needed.        STOP taking these medications    benazepriL 5 MG tablet  Commonly known as: LOTENSIN     methylPREDNISolone 4 mg tablet  Commonly known as: MEDROL RUBYCK            Sarah Portillo PA-C  [facility]  Department of Vascular Neurology   [facility]

## 2022-07-04 NOTE — SUBJECTIVE & OBJECTIVE
Interval History:  see above    Review of Systems   Constitutional: Negative.    HENT: Negative.     Eyes: Negative.    Respiratory: Negative.     Cardiovascular: Negative.    Gastrointestinal: Negative.    Endocrine: Negative.    Genitourinary: Negative.    Musculoskeletal:  Positive for back pain.   Skin: Negative.    Neurological: Negative.    Hematological: Negative.    Psychiatric/Behavioral: Negative.         Objective:     Vitals:  Temp: 98 °F (36.7 °C)  Pulse: 67  Rhythm: sinus bradycardia  BP: (!) 145/68  MAP (mmHg): 98  Resp: 20  SpO2: 99 %  O2 Device (Oxygen Therapy): room air    Temp  Min: 97.8 °F (36.6 °C)  Max: 98.5 °F (36.9 °C)  Pulse  Min: 54  Max: 79  BP  Min: 101/53  Max: 202/90  MAP (mmHg)  Min: 73  Max: 129  Resp  Min: 11  Max: 27  SpO2  Min: 95 %  Max: 100 %    07/03 0701 - 07/04 0700  In: 500 [P.O.:500]  Out: 450 [Urine:450]   Unmeasured Output  Urine Occurrence: 1  Stool Occurrence: 1       Physical Exam  Constitutional:       Appearance: Normal appearance.   HENT:      Head: Normocephalic and atraumatic.      Nose: Nose normal.   Eyes:      Extraocular Movements: Extraocular movements intact.   Cardiovascular:      Rate and Rhythm: Normal rate and regular rhythm.      Pulses: Normal pulses.      Heart sounds: Normal heart sounds.   Pulmonary:      Effort: Pulmonary effort is normal.      Breath sounds: Normal breath sounds.   Abdominal:      General: Abdomen is flat. Bowel sounds are normal.      Palpations: Abdomen is soft.   Musculoskeletal:         General: Normal range of motion.      Cervical back: Normal range of motion.   Skin:     General: Skin is warm and dry.   Neurological:      General: No focal deficit present.      Mental Status: He is alert and oriented to person, place, and time.      Cranial Nerves: Cranial nerves 2-12 are intact.      Motor: Motor function is intact.      Coordination: Coordination is intact.   Psychiatric:         Mood and Affect: Mood normal.          Behavior: Behavior normal.       Medications:  Continuous Scheduledaspirin, 81 mg, Daily  atorvastatin, 40 mg, Daily  carvediloL, 25 mg, BID  clopidogreL, 75 mg, Daily  enoxaparin, 40 mg, Daily  LIDOcaine, 1 patch, Q24H  losartan, 25 mg, Daily  senna-docusate 8.6-50 mg, 1 tablet, BID    PRNacetaminophen, 650 mg, Q6H PRN  dextrose 10%, 12.5 g, PRN  dextrose 10%, 25 g, PRN  glucagon (human recombinant), 1 mg, PRN  glucose, 16 g, PRN  glucose, 24 g, PRN  hydrALAZINE, 10 mg, Q6H PRN  insulin aspart U-100, 1-10 Units, QID (AC + HS) PRN  labetalol, 10 mg, Q4H PRN  sodium chloride 0.9%, 10 mL, PRN      Today I personally reviewed pertinent medications, lines/drains/airways, imaging, cardiology results, laboratory results, microbiology results, notably:    Diet  Diet diabetic [facility]; 2000 Calorie; Thin  Diet diabetic [facility]; 2000 Calorie; Thin

## 2022-07-04 NOTE — PT/OT/SLP EVAL
Occupational Therapy   Evaluation and Discharge Note    Name: Elias Iverson  MRN: [id number]  Admitting Diagnosis:  Thrombotic stroke involving right middle cerebral artery   Recent Surgery: * No surgery found *      Recommendations:     Discharge Recommendations: home  Discharge Equipment Recommendations:  none  Barriers to discharge:  None    Assessment:     Elias Iverson is a 61 y.o. male with a medical diagnosis of Thrombotic stroke involving right middle cerebral artery. At this time, patient is functioning at their prior level of function and does not require further acute OT services.     Plan:     During this hospitalization, patient does not require further acute OT services.  Please re-consult if situation changes.    · Plan of Care Reviewed with: patient, spouse    Subjective     Chief Complaint: needing to go to the bathroom.   Patient/Family Comments/goals: to go home    Occupational Profile:  Living Environment: Patient lives with wife in a H with 3 KAORLINA to enter and B HRs. Patient can reach them at the same time. Patient tub shower combo with shower chair no grab bar. Patient has a regular toilet with no grab bar. Patient was independent with ADLs and functional mobility, drives, and works in construction.      Pain/Comfort:  · Pain Rating 1: 0/10  · Pain Rating Post-Intervention 1: 0/10    Patients cultural, spiritual, Hoahaoism conflicts given the current situation: no    Objective:     Communicated with: NSG prior to session.  Patient found HOB elevated with telemetry, pulse ox (continuous) upon OT entry to room.    General Precautions: Standard, fall   Orthopedic Precautions:N/A   Braces: N/A  Respiratory Status: Room air     Occupational Performance:    Bed Mobility:    · Patient completed Scooting/Bridging with modified independence anteriorly to EOB sitting EOB  · Patient completed Supine to Sit with modified independence  · Patient completed Sit to Supine with modified  independence    Functional Mobility/Transfers:  · Patient completed Sit <> Stand Transfer with modified independence  with  no assistive device   · Patient completed Toilet Transfer bed<>toilet with functional ambulation technique with supervision with  no AD (2x)    Activities of Daily Living:  · Grooming: modified independence standing at sink for oral care  · Lower Body Dressing: modified independence [place] and donning socks EOB  · Toileting: modified independence      Cognitive/Visual Perceptual:  Cognitive/Psychosocial Skills:     -       Oriented to: Person, Place, Time and Situation   -       Follows Commands/attention:Follows multistep  commands  -       Communication: clear/fluent  -       Memory: No Deficits noted  -       Safety awareness/insight to disability: intact   -       Mood/Affect/Coping skills/emotional control: Appropriate to situation  Visual/Perceptual:      -Intact      Physical Exam:  Upper Extremity Range of Motion:     -       Right Upper Extremity: WFL  -       Left Upper Extremity: WFL  Upper Extremity Strength:    -       Right Upper Extremity: WFL  -       Left Upper Extremity: WFL   Strength:    -       Right Upper Extremity: WFL  -       Left Upper Extremity: WFL  Fine Motor Coordination:    -       Intact  Gross motor coordination:   WFL    AMPAC 6 Click ADL:  AMPAC Total Score: 24    Treatment & Education:  Role of OT and POC  ADL retraining  Functional mobility training  Safety  Discharge planning    Education:  Patient left HOB elevated with call button in reach, nurse notified and all needs met.     GOALS:   Multidisciplinary Problems     Occupational Therapy Goals     Not on file          Multidisciplinary Problems (Resolved)        Problem: Occupational Therapy    Goal Priority Disciplines Outcome Interventions   Occupational Therapy Goal   (Resolved)     OT, PT/OT Met                    History:     Past Medical History:   Diagnosis Date    Diabetes mellitus      Hypertension        Past Surgical History:   Procedure Laterality Date    CHOLECYSTECTOMY         Time Tracking:     OT Date of Treatment: 07/04/22  OT Start Time: 1034  OT Stop Time: 1054  OT Total Time (min): 20 min    Billable Minutes:Evaluation 10  Self Care/Home Management 10    7/4/2022

## 2022-07-04 NOTE — HOSPITAL COURSE
07/04/2022 [facility]. Patient reports feeling back to his baseline. MRI conducted showing no evidence of acute infarct. Stepped down to vascular neurology today.

## 2022-07-04 NOTE — PROGRESS NOTES
[facility]  Neurocritical Care  Progress Note    Admit Date: 7/3/2022  Service Date: 07/04/2022  Length of Stay: 1    Subjective:     Chief Complaint: Thrombotic stroke involving right middle cerebral artery    History of Present Illness: Elias Iverson  Is a 61 y.o. male with a hx of hyperlipidemia, hypertension, DM, and intermittent back pain treated with oxycodone as needed. Patient reported L leg heaviness that progressed to the L arm. He brought himself into the ED at [facility] once began to experience these symptoms. He states that he has never had these symptoms before and that he went to sleep the previous night with no symptoms. He reports waking up this morning without these symptoms, and that the L leg heaviness began around 11:30 AM. Patient was given tpa at OSH for possible ischemic stroke intervention, tpa was completed at 1450. During examination patient denies any heaviness in the L arm or leg and reports he feels back to his baseline.       Hospital Course: 07/04/2022 NAEON. Patient reports feeling back to his baseline. MRI conducted showing no evidence of acute infarct. Stepped down to vascular neurology today.        Interval History:  see above    Review of Systems   Constitutional: Negative.    HENT: Negative.     Eyes: Negative.    Respiratory: Negative.     Cardiovascular: Negative.    Gastrointestinal: Negative.    Endocrine: Negative.    Genitourinary: Negative.    Musculoskeletal:  Positive for back pain.   Skin: Negative.    Neurological: Negative.    Hematological: Negative.    Psychiatric/Behavioral: Negative.         Objective:     Vitals:  Temp: 98 °F (36.7 °C)  Pulse: 67  Rhythm: sinus bradycardia  BP: (!) 145/68  MAP (mmHg): 98  Resp: 20  SpO2: 99 %  O2 Device (Oxygen Therapy): room air    Temp  Min: 97.8 °F (36.6 °C)  Max: 98.5 °F (36.9 °C)  Pulse  Min: 54  Max: 79  BP  Min: 101/53  Max: 202/90  MAP (mmHg)  Min: 73  Max: 129  Resp  Min: 11  Max:  27  SpO2  Min: 95 %  Max: 100 %    07/03 0701 - 07/04 0700  In: 500 [P.O.:500]  Out: 450 [Urine:450]   Unmeasured Output  Urine Occurrence: 1  Stool Occurrence: 1       Physical Exam  Constitutional:       Appearance: Normal appearance.   HENT:      Head: Normocephalic and atraumatic.      Nose: Nose normal.   Eyes:      Extraocular Movements: Extraocular movements intact.   Cardiovascular:      Rate and Rhythm: Normal rate and regular rhythm.      Pulses: Normal pulses.      Heart sounds: Normal heart sounds.   Pulmonary:      Effort: Pulmonary effort is normal.      Breath sounds: Normal breath sounds.   Abdominal:      General: Abdomen is flat. Bowel sounds are normal.      Palpations: Abdomen is soft.   Musculoskeletal:         General: Normal range of motion.      Cervical back: Normal range of motion.   Skin:     General: Skin is warm and dry.   Neurological:      General: No focal deficit present.      Mental Status: He is alert and oriented to person, place, and time.      Cranial Nerves: Cranial nerves 2-12 are intact.      Motor: Motor function is intact.      Coordination: Coordination is intact.   Psychiatric:         Mood and Affect: Mood normal.         Behavior: Behavior normal.       Medications:  Continuous Scheduledaspirin, 81 mg, Daily  atorvastatin, 40 mg, Daily  carvediloL, 25 mg, BID  clopidogreL, 75 mg, Daily  enoxaparin, 40 mg, Daily  LIDOcaine, 1 patch, Q24H  losartan, 25 mg, Daily  senna-docusate 8.6-50 mg, 1 tablet, BID    PRNacetaminophen, 650 mg, Q6H PRN  dextrose 10%, 12.5 g, PRN  dextrose 10%, 25 g, PRN  glucagon (human recombinant), 1 mg, PRN  glucose, 16 g, PRN  glucose, 24 g, PRN  hydrALAZINE, 10 mg, Q6H PRN  insulin aspart U-100, 1-10 Units, QID (AC + HS) PRN  labetalol, 10 mg, Q4H PRN  sodium chloride 0.9%, 10 mL, PRN      Today I personally reviewed pertinent medications, lines/drains/airways, imaging, cardiology results, laboratory results, microbiology results,  notably:    Diet  Diet diabetic [facility]; 2000 Calorie; Thin  Diet diabetic [facility]; 2000 Calorie; Thin        Assessment/Plan:     Neuro  Acute ischemic right MCA stroke  Pt reported to OSH with L leg and L arm heaviness, was treated with tpa for possible ischemic stroke. Tpa end time at 1450. NIHSS of 0 upon exam in ED. EKG obtained, reported as normal. MRI obtained and reported as containing no evidence of acute infarct. Symptoms likely 2/2 TIA or stroke mediated by tpa.  -CT completed, shows no hyperdensities indicative of a bleed   -CTA completed, shows not LVO  -Lipid panel, and TSH labs within normal limits  - HbA1c labs completed elevated at 6.3  -Echo with PFO ordered   -PT/OT/SLP as appropriate  -Stared ASA and plavix today after 24 hours since tpa end time-> started on 07/04/2022 at 1600  -Stepped down to vascular neurology    Cardiac/Vascular  Essential hypertension  -Pt given tpa at outside hospital, BP goals are <180   -prn hydralizine, prn labetalol as appropriate   -07/04/2022: MRI showing no acute infarct, BP goals now normotensive  -started home coreg 25mg  -started losartan 25mg        Mixed hyperlipidemia  -lipid panel within normal limits as of 07/04/2022  -c/w atorvastatin 40     Endocrine  Type 2 diabetes mellitus  -SSI          The patient is being Prophylaxed for:  Venous Thromboembolism with: Mechanical or Chemical  Stress Ulcer with: None  Ventilator Pneumonia with: none    Activity Orders          Diet diabetic [facility]; 2000 Calorie; Thin: Diabetic starting at 07/03 1823    Turn patient starting at 07/03 1800    Elevate HOB starting at 07/03 1616        Full Code    Nette Long MD  Neurocritical Care  [facility]

## 2022-07-04 NOTE — PLAN OF CARE
Problem: Physical Therapy  Goal: Physical Therapy Goal  Description: PT evaluation complete - no acute deficits.  Outcome: Met

## 2022-07-04 NOTE — NURSING
2105- In PM rounds, patient's pupillary response discussed with Jerilyn ORTIZ. Patient reports that his R pupil is dilated larger than his L at baseline from a previous accident over 10 years ago. Per the pupillometer, patients R pupil is sluggish and 1.87mm larger than the L pupil. The L pupil is briskly reactive. Jerilyn ORTIZ verbalized understanding.     Jerilyn ORTIZ also made aware that patient is complaining of pain from what he thinks is a pulled muscle to the R trunk. Tylenol given and heat applied. Pain 8/10--> 4/10 with these interventions. Lidocaine Patch ordered.

## 2022-07-04 NOTE — ASSESSMENT & PLAN NOTE
Pt reported to OSH with L leg and L arm heaviness, was treated with tpa for possible ischemic stroke. Tpa end time at 1450. NIHSS of 0 upon exam in ED. EKG obtained, reported as normal. MRI obtained and reported as containing no evidence of acute infarct. Symptoms likely 2/2 TIA or stroke mediated by tpa.  -CT completed, shows no hyperdensities indicative of a bleed   -CTA completed, shows not LVO  -Lipid panel, and TSH labs within normal limits  - HbA1c labs completed elevated at 6.3  -Echo with PFO ordered   -PT/OT/SLP as appropriate  -Stared ASA and plavix today after 24 hours since tpa end time-> started on 07/04/2022 at 1600  -Stepped down to vascular neurology

## 2022-07-04 NOTE — DISCHARGE SUMMARY
Patient being discharged to HOME per orders.     Discharge instructions and AVS reviewed with patient. Patient verbalized understanding. All questions addressed. LDAs removed.     Patient transported via wheelchair to ride home with belongings, stroke booklet and prescriptions.

## 2022-07-04 NOTE — PROGRESS NOTES
[facility]  Vascular Neurology  [facility]  Progress Note    Assessment/Plan:     * Thrombotic stroke involving right middle cerebral artery  61yoM with PMH HTN, HLD, DM that presented to OSH with acute onset L hemiparesis and paresthesia. LKN 12:30pm, initial NIH 3. CTH/CTA @ OSH unremarkable for intracranial hemorrhage or LVO. No contraindications to tPA, given at 2:01pm prior to transfer. Patient will be admitted to [facility] for post-tPA monitoring and further stroke workup.  Etiology at this time felt likely small vessel disease.    Patient in [facility] s/p tPA. Patient NIHSS 0 with stable neuro exam, plans for discharge home today. MRI negative for evidence of acute intracranial process, specifically no evidence of acute infarct. Patient will need TTE post discharge. Patient will need to be discharged on DAPT for 30 days. No outpatient or inpatient therapy needs post-discharge.     Antithrombotics for secondary stroke prevention: Antiplatelets: Aspirin: 81 mg daily  Clopidogrel: 75 mg dailyx 30 days    Statins for secondary stroke prevention and hyperlipidemia, if present:   Statins: Atorvastatin- 40 mg daily    Aggressive risk factor modification: HTN, DM, HLD     Rehab efforts: The patient has been evaluated by a stroke team provider and the therapy needs have been fully considered based off the presenting complaints and exam findings. The following therapy evaluations are needed: PT evaluate and treat, OT evaluate and treat, SLP evaluate and treat    Diagnostics ordered/pending: HgbA1C to assess blood glucose levels, MRI head without contrast to assess brain parenchyma, TTE to assess cardiac function/status , TSH to assess thyroid function    VTE prophylaxis: Heparin 5000 units SQ every 8 hours  Mechanical prophylaxis: Place SCDs    BP parameters: Infarct: Post tPA, SBP <180        Type 2 diabetes mellitus  Stroke risk factor  A1c pending    IP blood glucose goal 140-180  Takes DM  med at home, patient unsure of name and not listed in chart      Essential hypertension  Stroke risk factor  Goal SBP <180 post-tPA    BP on arrival 178/92  On HTN meds at home    Mixed hyperlipidemia  Stroke risk factor  LDL 79, goal <70    Atorvastatin 40mg           07/04/2022 Patient in [facility] s/p tPA. Patient NIHSS 0 with stable neuro exam, plans for discharge home today. MRI negative for evidence of acute intracranial process, specifically no evidence of acute infarct. Patient will need TTE post discharge. Patient will need to be discharged on DAPT for 30 days. No outpatient or inpatient therapy needs post-discharge.       STROKE DOCUMENTATION   Acute Stroke Times   Last Known Normal Date: 07/03/22  Last Known Normal Time: 1230  Symptom Onset Date: 07/03/22  Symptom Onset Time: 1230  Stroke Team Called Date: 07/03/22  Stroke Team Called Time: 1501  Stroke Team Arrival Date: 07/03/22  Stroke Team Arrival Time: 1505  CT Interpretation Time: 1530  Alteplase Recommended: Yes  CTA Interpretation Time: 1530  Thrombectomy Recommended: No  Decision to Treat Time for Alteplase: 1345    NIH Scale:  1a. Level of Consciousness: 0-->Alert, keenly responsive  1b. LOC Questions: 0-->Answers both questions correctly  1c. LOC Commands: 0-->Performs both tasks correctly  2. Best Gaze: 0-->Normal  3. Visual: 0-->No visual loss  4. Facial Palsy: 0-->Normal symmetrical movements  5a. Motor Arm, Left: 0-->No drift, limb holds 90 (or 45) degrees for full 10 secs  5b. Motor Arm, Right: 0-->No drift, limb holds 90 (or 45) degrees for full 10 secs  6a. Motor Leg, Left: 0-->No drift, leg holds 30 degree position for full 5 secs  6b. Motor Leg, Right: 0-->No drift, leg holds 30 degree position for full 5 secs  7. Limb Ataxia: 0-->Absent  8. Sensory: 0-->Normal, no sensory loss  9. Best Language: 0-->No aphasia, normal  10. Dysarthria: 0-->Normal  11. Extinction and Inattention (formerly Neglect): 0-->No abnormality  Total (NIH Stroke  Scale): 0       Modified Davis Score: 0  [place] Coma Scale:    ABCD2 Score:    [id number] Score:   HAS -BLED Score:   ICH Score:   Hunt & Lopez Classification:      Hemorrhagic change of an Ischemic Stroke: Does this patient have an ischemic stroke with hemorrhagic changes? No     Neurologic Chief Complaint: LSW and paresthesias    Subjective:     Interval History: Patient is seen for follow-up neurological assessment and treatment recommendations: Patient in [facility] s/p tPA. Patient NIHSS 0 with stable neuro exam, plans for discharge home today. MRI negative for evidence of acute intracranial process, specifically no evidence of acute infarct. Patient will need TTE post discharge. Patient will need to be discharged on DAPT for 30 days. No outpatient or inpatient therapy needs post-discharge.     HPI, Past Medical, Family, and Social History remains the same as documented in the initial encounter.     Review of Systems   Constitutional:  Negative for fever.   Respiratory:  Negative for cough.    Cardiovascular:  Negative for leg swelling.   Gastrointestinal:  Negative for vomiting.   Neurological:  Negative for facial asymmetry, speech difficulty, weakness, light-headedness, numbness and headaches.   Psychiatric/Behavioral:  Negative for agitation.    Scheduled Meds:   aspirin  81 mg Oral Daily    atorvastatin  40 mg Oral Daily    carvediloL  25 mg Oral BID    clopidogreL  75 mg Oral Daily    enoxaparin  40 mg Subcutaneous Daily    LIDOcaine  1 patch Transdermal Q24H    losartan  25 mg Oral Daily    senna-docusate 8.6-50 mg  1 tablet Oral BID     Continuous Infusions:  PRN Meds:acetaminophen, dextrose 10%, dextrose 10%, glucagon (human recombinant), glucose, glucose, hydrALAZINE, insulin aspart U-100, labetalol, sodium chloride 0.9%    Objective:     Vital Signs (Most Recent):  Temp: 98 °F (36.7 °C) (07/04/22 1100)  Pulse: 60 (07/04/22 1450)  Resp: 15 (07/04/22 1450)  BP: (!) 141/67 (07/04/22 1450)  SpO2: 100  % (07/04/22 1450)  BP Location: Right arm    Vital Signs Range (Last 24H):  Temp:  [97.8 °F (36.6 °C)-98.5 °F (36.9 °C)]   Pulse:  [54-79]   Resp:  [11-27]   BP: (101-202)/(50-90)   SpO2:  [95 %-100 %]   BP Location: Right arm    Physical Exam  Vitals reviewed.   Constitutional:       General: He is not in acute distress.     Appearance: Normal appearance.      Comments: Overweight   HENT:      Head: Normocephalic and atraumatic.   Eyes:      General: No visual field deficit.     Extraocular Movements: Extraocular movements intact.      Pupils: Pupils are equal, round, and reactive to light.   Cardiovascular:      Rate and Rhythm: Normal rate.   Pulmonary:      Effort: Pulmonary effort is normal. No respiratory distress.   Musculoskeletal:         General: No swelling.   Skin:     General: Skin is warm and dry.   Neurological:      Mental Status: He is alert and oriented to person, place, and time. Mental status is at baseline.      GCS: GCS eye subscore is 4. GCS verbal subscore is 5. GCS motor subscore is 6.      Cranial Nerves: No dysarthria or facial asymmetry.      Sensory: No sensory deficit.      Motor: No weakness or pronator drift.   Psychiatric:         Mood and Affect: Mood normal.         Behavior: Behavior normal.       Neurological Exam:   LOC: alert  Attention Span: Good   Language: No aphasia  Articulation: No dysarthria  Orientation: Person, Place, Time   Visual Fields: Full  EOM (CN III, IV, VI): Full/intact  Facial Movement (CN VII): Symmetric facial expression    Motor: Arm left  Normal 5/5  Leg left  Normal 5/5  Arm right  Normal 5/5  Leg right Normal 5/5  Sensation: Intact to light touch    Laboratory:  CMP:   Recent Labs   Lab 07/04/22  0309   CALCIUM 8.9   ALBUMIN 3.7   PROT 6.2      K 3.6   CO2 25      BUN 16   CREATININE 0.8   ALKPHOS 60   ALT 42   AST 22   BILITOT 1.1*     BMP:   Recent Labs   Lab 07/04/22  0309      K 3.6      CO2 25   BUN 16   CREATININE 0.8    CALCIUM 8.9     CBC:   Recent Labs   Lab 07/04/22  0309   WBC 5.67   RBC 4.33*   HGB 13.0*   HCT 39.2*   *   MCV 91   MCH 30.0   MCHC 33.2     Lipid Panel:   Recent Labs   Lab 07/03/22  1522   CHOL 149   LDLCALC 79.2   HDL 43   TRIG 134     Coagulation:   Recent Labs   Lab 07/03/22  1343 07/03/22  1522   INR 1.0 1.1   APTT 29.1  --      Platelet Aggregation Study: No results for input(s): PLTAGG, PLTAGINTERP, PLTAGREGLACO, ADPPLTAGGREG in the last 168 hours.  Hgb A1C:   Recent Labs   Lab 07/03/22  1834   HGBA1C 6.3*     TSH:   Recent Labs   Lab 07/03/22  1522   TSH 0.514       Diagnostic Results     Brain imaging:  MRI Brain 7/4/22  No acute intracranial process with specifically no evidence of acute infarct.      CTH @ OSH 7/3/22  No acute intracranial pathology     Vessel Imaging:  CTA @ OSH 7/3/22  1. Minimal atherosclerotic disease involving the distal right CCA and the carotid bulb without hemodynamically significant stenosis.  2. The plaque extends into the proximal right ICA, resulting in approximately 20% stenosis.      Cardiac Evaluation:   TTE ordered, will need to be completed outpatient.      Sarah Portillo PA-C  [facility]  Department of Vascular Neurology   [facility]

## 2022-07-04 NOTE — PLAN OF CARE
[facility] Care Plan    POC reviewed with Elias Iverson and family at 0300. Pt verbalized understanding. Questions and concerns addressed. No acute events overnight. Pt progressing toward goals. Will continue to monitor. See below and flowsheets for full assessment and VS info.     -neuro exam stable  -lidocaine patch added for pain to R chest from possible pulled muscle   -BM X1       Is this a stroke patient? yes-stroke booklet at bedside and reviewed with patient     Neuro:  [place] Coma Scale  Best Eye Response: 3-->(E3) to speech  Best Motor Response: 6-->(M6) obeys commands  Best Verbal Response: 5-->(V5) oriented  [place] Coma Scale Score: 14  Pupil PERRLA: no     24hr Temp:  [98 °F (36.7 °C)-98.5 °F (36.9 °C)]     CV:   Rhythm: sinus bradycardia  BP goals:   SBP < 180  MAP > 65    Resp:   O2 Device (Oxygen Therapy): room air       Plan: N/A    GI/:     Diet/Nutrition Received: consistent carb/diabetic diet  Last Bowel Movement: 07/03/22       Intake/Output Summary (Last 24 hours) at 7/4/2022 0447  Last data filed at 7/3/2022 2100  Gross per 24 hour   Intake 200 ml   Output 450 ml   Net -250 ml     Unmeasured Output  Stool Occurrence: 1    Labs/Accuchecks:  Recent Labs   Lab 07/04/22  0309   WBC 5.67   RBC 4.33*   HGB 13.0*   HCT 39.2*   *      Recent Labs   Lab 07/04/22  0309      K 3.6   CO2 25      BUN 16   CREATININE 0.8   ALKPHOS 60   ALT 42   AST 22   BILITOT 1.1*      Recent Labs   Lab 07/03/22  1343 07/03/22  1522   INR 1.0 1.1   APTT 29.1  --       Recent Labs   Lab 07/03/22  1343   TROPONINI <0.012       Electrolytes: No replacement orders  Accuchecks: ACHS    Gtts:      LDA/Wounds:  Lines/Drains/Airways       Peripheral Intravenous Line  Duration                  Peripheral IV - Single Lumen 07/03/22 1340 20 G Left Hand <1 day         Peripheral IV - Single Lumen 07/03/22 1405 20 G Right Wrist <1 day                  Wounds: No  Wound care consulted: No

## 2022-07-04 NOTE — ASSESSMENT & PLAN NOTE
61yoM with PMH HTN, HLD, DM that presented to OSH with acute onset L hemiparesis and paresthesia. LKN 12:30pm, initial NIH 3. CTH/CTA @ OSH unremarkable for intracranial hemorrhage or LVO. No contraindications to tPA, given at 2:01pm prior to transfer. Patient will be admitted to [facility] for post-tPA monitoring and further stroke workup.  Etiology at this time felt likely small vessel disease.    On day of discharge, NIHSS 0, patient reports feeling back to baseline and denies any deficits. Patient will be sent home on DAPT for 30 days, then ASA indefinitely for secondary stroke prevention. Patient also going home with Atorvastatin 40 MG. TTE was not completed in the duration of inpatient stay, therefore patient will be discharged with orders for outpatient TTE. Patient was evaluated by PT/OT/SLP and is without any needs for followup with therapy. Patient to follow up with PCP within 1 week and vascular neurology in 4-6 weeks.     Antithrombotics for secondary stroke prevention: Antiplatelets: Aspirin: 81 mg daily  Clopidogrel: 75 mg dailyx 30 days    Statins for secondary stroke prevention and hyperlipidemia, if present:   Statins: Atorvastatin- 40 mg daily    Aggressive risk factor modification: HTN, DM, HLD     Rehab efforts: No therapy needs    BP parameters: Normotensive

## 2022-07-04 NOTE — SUBJECTIVE & OBJECTIVE
Neurologic Chief Complaint: LSW and paresthesias    Subjective:     Interval History: Patient is seen for follow-up neurological assessment and treatment recommendations: Patient in [facility] s/p tPA. Patient NIHSS 0 with stable neuro exam, plans for discharge home today. MRI negative for evidence of acute intracranial process, specifically no evidence of acute infarct. Patient will need TTE post discharge. Patient will need to be discharged on DAPT for 30 days. No outpatient or inpatient therapy needs post-discharge.     HPI, Past Medical, Family, and Social History remains the same as documented in the initial encounter.     Review of Systems   Constitutional:  Negative for fever.   Respiratory:  Negative for cough.    Cardiovascular:  Negative for leg swelling.   Gastrointestinal:  Negative for vomiting.   Neurological:  Negative for facial asymmetry, speech difficulty, weakness, light-headedness, numbness and headaches.   Psychiatric/Behavioral:  Negative for agitation.    Scheduled Meds:   aspirin  81 mg Oral Daily    atorvastatin  40 mg Oral Daily    carvediloL  25 mg Oral BID    clopidogreL  75 mg Oral Daily    enoxaparin  40 mg Subcutaneous Daily    LIDOcaine  1 patch Transdermal Q24H    losartan  25 mg Oral Daily    senna-docusate 8.6-50 mg  1 tablet Oral BID     Continuous Infusions:  PRN Meds:acetaminophen, dextrose 10%, dextrose 10%, glucagon (human recombinant), glucose, glucose, hydrALAZINE, insulin aspart U-100, labetalol, sodium chloride 0.9%    Objective:     Vital Signs (Most Recent):  Temp: 98 °F (36.7 °C) (07/04/22 1100)  Pulse: 60 (07/04/22 1450)  Resp: 15 (07/04/22 1450)  BP: (!) 141/67 (07/04/22 1450)  SpO2: 100 % (07/04/22 1450)  BP Location: Right arm    Vital Signs Range (Last 24H):  Temp:  [97.8 °F (36.6 °C)-98.5 °F (36.9 °C)]   Pulse:  [54-79]   Resp:  [11-27]   BP: (101-202)/(50-90)   SpO2:  [95 %-100 %]   BP Location: Right arm    Physical Exam  Vitals reviewed.   Constitutional:        General: He is not in acute distress.     Appearance: Normal appearance.      Comments: Overweight   HENT:      Head: Normocephalic and atraumatic.   Eyes:      General: No visual field deficit.     Extraocular Movements: Extraocular movements intact.      Pupils: Pupils are equal, round, and reactive to light.   Cardiovascular:      Rate and Rhythm: Normal rate.   Pulmonary:      Effort: Pulmonary effort is normal. No respiratory distress.   Musculoskeletal:         General: No swelling.   Skin:     General: Skin is warm and dry.   Neurological:      Mental Status: He is alert and oriented to person, place, and time. Mental status is at baseline.      GCS: GCS eye subscore is 4. GCS verbal subscore is 5. GCS motor subscore is 6.      Cranial Nerves: No dysarthria or facial asymmetry.      Sensory: No sensory deficit.      Motor: No weakness or pronator drift.   Psychiatric:         Mood and Affect: Mood normal.         Behavior: Behavior normal.       Neurological Exam:   LOC: alert  Attention Span: Good   Language: No aphasia  Articulation: No dysarthria  Orientation: Person, Place, Time   Visual Fields: Full  EOM (CN III, IV, VI): Full/intact  Facial Movement (CN VII): Symmetric facial expression    Motor: Arm left  Normal 5/5  Leg left  Normal 5/5  Arm right  Normal 5/5  Leg right Normal 5/5  Sensation: Intact to light touch    Laboratory:  CMP:   Recent Labs   Lab 07/04/22  0309   CALCIUM 8.9   ALBUMIN 3.7   PROT 6.2      K 3.6   CO2 25      BUN 16   CREATININE 0.8   ALKPHOS 60   ALT 42   AST 22   BILITOT 1.1*     BMP:   Recent Labs   Lab 07/04/22  0309      K 3.6      CO2 25   BUN 16   CREATININE 0.8   CALCIUM 8.9     CBC:   Recent Labs   Lab 07/04/22  0309   WBC 5.67   RBC 4.33*   HGB 13.0*   HCT 39.2*   *   MCV 91   MCH 30.0   MCHC 33.2     Lipid Panel:   Recent Labs   Lab 07/03/22  1522   CHOL 149   LDLCALC 79.2   HDL 43   TRIG 134     Coagulation:   Recent Labs   Lab  07/03/22  1343 07/03/22  1522   INR 1.0 1.1   APTT 29.1  --      Platelet Aggregation Study: No results for input(s): PLTAGG, PLTAGINTERP, PLTAGREGLACO, ADPPLTAGGREG in the last 168 hours.  Hgb A1C:   Recent Labs   Lab 07/03/22  1834   HGBA1C 6.3*     TSH:   Recent Labs   Lab 07/03/22  1522   TSH 0.514       Diagnostic Results     Brain imaging:  MRI Brain 7/4/22  No acute intracranial process with specifically no evidence of acute infarct.      CTH @ OSH 7/3/22  No acute intracranial pathology     Vessel Imaging:  CTA @ OSH 7/3/22  1. Minimal atherosclerotic disease involving the distal right CCA and the carotid bulb without hemodynamically significant stenosis.  2. The plaque extends into the proximal right ICA, resulting in approximately 20% stenosis.      Cardiac Evaluation:   TTE ordered, will need to be completed outpatient.

## 2022-07-04 NOTE — HOSPITAL COURSE
Mr. Iverson is a 60 yo male with PMH HTN, HLD, DM that presented to OSH with acute onset L hemiparesis and paresthesia on 7/3. LKN 12:30pm, approximately 1hr PTA. Denies associated headache, vision deficits, slurred speech, N/V, gait difficulty, or syncope. Evaluated by telestroke, initial NIH 3 and CTH unremarkable. Determined to be tPA candidate, given at 2:01pm. Patient transferred to [facility] for higher level care. On arrival, symptoms resolved and patient feels back to baseline. Neuro exam nonfocal with NIH 0. Patient was admitted to [facility] for higher level of care and post-tPA monitoring. MRI completed and without evidence of acute infarct. Etiology ESUS. Given patient presumed ESUS etiology, he will be discharged with 30 day cardiac event monitor.     On day of discharge, NIHSS 0, patient reports feeling back to baseline and denies any deficits. Patient will be sent home on DAPT for 30 days, then ASA indefinitely for secondary stroke prevention. Patient also going home with Atorvastatin 40 MG. TTE was not completed in the duration of inpatient stay, therefore patient will be discharged with orders for outpatient TTE. Patient was evaluated by PT/OT/SLP and is without any needs for followup with therapy. Patient to follow up with PCP within 1 week and vascular neurology in 4-6 weeks.     For a more detailed summary of hospital course, please see below.    07/04/2022 Patient in [facility] s/p tPA. Patient NIHSS 0 with stable neuro exam, plans for discharge home today. MRI negative for evidence of acute intracranial process, specifically no evidence of acute infarct. Patient will need TTE post discharge. Patient will need to be discharged on DAPT for 30 days. No outpatient or inpatient therapy needs post-discharge.

## 2022-07-04 NOTE — PLAN OF CARE
[facility] Care Plan    POC reviewed with Elias Iverson and family at 1400. Pt verbalized understanding. Questions and concerns addressed. No acute events today. Pt progressing toward goals. Will continue to monitor. See below and flowsheets for full assessment and VS info.     24 hour post TPA MRI done  Aspirin/ Plavix/ Lovenox to start 7/4 1600  Transfer/Discharge planning       Is this a stroke patient? yes- Stroke booklet reviewed with patient and family, risk factors identified for patient and stroke booklet remains at bedside for ongoing education.     Neuro:  Hubert Coma Scale  Best Eye Response: 4-->(E4) spontaneous  Best Motor Response: 6-->(M6) obeys commands  Best Verbal Response: 5-->(V5) oriented  [place] Coma Scale Score: 15  Pupil PERRLA: no     24 hr Temp:  [97.8 °F (36.6 °C)-98.5 °F (36.9 °C)]     CV:   Rhythm: sinus bradycardia  BP goals:   SBP < 180  MAP > 65    Resp:   O2 Device (Oxygen Therapy): room air       Plan: N/A    GI/:     Diet/Nutrition Received: consistent carb/diabetic diet  Last Bowel Movement: 07/04/22       Intake/Output Summary (Last 24 hours) at 7/4/2022 1429  Last data filed at 7/4/2022 1300  Gross per 24 hour   Intake 852 ml   Output 450 ml   Net 402 ml     Unmeasured Output  Urine Occurrence: 1  Stool Occurrence: 1    Labs/Accuchecks:  Recent Labs   Lab 07/04/22  0309   WBC 5.67   RBC 4.33*   HGB 13.0*   HCT 39.2*   *      Recent Labs   Lab 07/04/22  0309      K 3.6   CO2 25      BUN 16   CREATININE 0.8   ALKPHOS 60   ALT 42   AST 22   BILITOT 1.1*      Recent Labs   Lab 07/03/22  1343 07/03/22  1522   INR 1.0 1.1   APTT 29.1  --       Recent Labs   Lab 07/03/22  1343   TROPONINI <0.012       Electrolytes: N/A - electrolytes WDL  Accuchecks: ACHS    Gtts:      LDA/Wounds:  Lines/Drains/Airways       Peripheral Intravenous Line  Duration                  Peripheral IV - Single Lumen 07/03/22 1340 20 G Left Hand 1 day         Peripheral IV - Single Lumen  07/03/22 1405 20 G Right Wrist 1 day                  Wounds: No  Wound care consulted: No

## 2022-07-04 NOTE — ASSESSMENT & PLAN NOTE
-Pt given tpa at outside hospital, BP goals are <180   -prn hydralizine, prn labetalol as appropriate   -07/04/2022: MRI showing no acute infarct, BP goals now normotensive  -started home coreg 25mg  -started losartan 25mg

## 2022-07-04 NOTE — PLAN OF CARE
Problem: SLP  Goal: SLP Goal  Outcome: Met    Evaluation completed. Rec continue regular diet with thin liquids with strict aspiration precautions.  No further ST indicated.  Pt at baseline.

## 2022-07-04 NOTE — PT/OT/SLP EVAL
"Speech Language Pathology Evaluation  Cognitive/Bedside Swallow  Discharge    Patient Name:  Elias Iverson   MRN:  [id number]  Admitting Diagnosis: Thrombotic stroke involving right middle cerebral artery    Recommendations:                  General Recommendations:  Follow-up not indicated  Diet recommendations:  Regular, Thin   Aspiration Precautions: Strict aspiration precautions   General Precautions: Standard, aspiration, fall  Communication strategies:  go to room if call light pushed    History:     Past Medical History:   Diagnosis Date    Diabetes mellitus     Hypertension        Past Surgical History:   Procedure Laterality Date    CHOLECYSTECTOMY         Social History: Patient lives with his spouse.  IND pta, works construction.  Reg diet/thin liquids at baseline.     Prior Intubation HX:  none    Modified Barium Swallow: none    Chest X-Rays: 7/3: No acute intrathoracic abnormality identified on this single radiographic view of the chest.    Subjective     "I had a light stroke."     Nurse cleared for session, regular diet ordered prior to evaluation.     Pain/Comfort:  · Pain Rating 1: 3/10  · Location - Side 1: Right  · Location 1: rib(s)  · Pain Addressed 1: Distraction  · Pain Rating Post-Intervention 1: 3/10    Respiratory Status: Room air    Objective:     Cognitive Status:    Orientation Oriented x4  Memory Immediate Recall 100% and Delayed2/3 unassociated words recalled after delay  Problem Solving Categories 100%, Sequencing 100%, Solutions 100% and Compare/contrast 100%     money/time management 100%    Receptive Language:   Comprehension:   WFL    Pragmatics:    WFL    Expressive Language:  Verbal:    fluent, no evidence of anomia    Motor Speech:  WFL    Voice:   WFL    Visual-Spatial:  WFL    Reading:   Paragraph 100%       Oral Musculature Evaluation  · Oral Musculature: WFL  · Dentition: present and adequate  · Secretion Management: adequate  · Mucosal Quality: good  · Mandibular " Strength and Mobility: WNL  · Oral Labial Strength and Mobility: WNL  · Lingual Strength and Mobility: WNL  · Buccal Strength and Mobility: WNL  · Volitional Cough: WNL  · Volitional Swallow: WNL  · Voice Prior to PO Intake: clear    Bedside Swallow Eval:   Consistencies Assessed:  · Thin liquids    · Solids        Oral Phase:   · WNL    Pharyngeal Phase:   · no overt clinical signs/symptoms of aspiration  · no overt clinical signs/symptoms of pharyngeal dysphagia    Compensatory Strategies  · None    Treatment: Education provided re: role of SLP, diet recs, swallow precs, s/s aspiration, results of evaluation, and discharge from SLP services.  Pt and spouse verbalized understanding and agreement.       Assessment:     Elias Iverson is a 61 y.o. male with an SLP diagnosis of oropharyngeal phases of swallow WFL.  Baseline speech, language, cognition.    Goals:   Multidisciplinary Problems     SLP Goals     Not on file          Multidisciplinary Problems (Resolved)        Problem: SLP    Goal Priority Disciplines Outcome   SLP Goal   (Resolved)     SLP Met                   Plan:     · Patient to be seen:      · Plan of Care expires:     · Plan of Care reviewed with:  patient, spouse   · SLP Follow-Up:  No       Discharge recommendations:  Discharge Facility/Level of Care Needs: home   Barriers to Discharge:  None    Time Tracking:     SLP Treatment Date:   07/04/22  Speech Start Time:  0714  Speech Stop Time:  0726     Speech Total Time (min):  12 min    Billable Minutes: Eval 6  and Eval Swallow and Oral Function 6    07/04/2022

## 2022-07-04 NOTE — PT/OT/SLP EVAL
Physical Therapy Evaluation & Discharge    Patient Name:  Elias Iverson   MRN:  [id number]  Admit Date: 7/3/2022  Admitting Diagnosis:  Thrombotic stroke involving right middle cerebral artery  Length of Stay: 1 days  Recent Surgery: * No surgery found *      Recommendations:     Discharge Recommendations:  home   Discharge Equipment Recommendations: none   Barriers to discharge: None    Assessment:     Elias Iverson is a 61 y.o. male admitted with a medical diagnosis of Thrombotic stroke involving right middle cerebral artery. Medical history includes diabetes. Today he was able to perform all functional mobility with supervision. At this time, patient is at their functional baseline and acute PT services are not indicated. PT will discontinue orders. See detailed evaluation below:    Problem List:  n/a    Plan:     Discharge PT orders. Please continue to encourage patient mobility.    Subjective   Communicated with RN prior to session.  Patient found HOB elevated upon PT entry to room, agreeable to evaluation.     Chief Complaint: Stroke Transfer  (Reported to [facility], L arm and L leg heaviness. TPA given, completed 1450. Pt states symptoms have resolved to tingling and sensation is returning. )    Patient/Family Comments/goals: to go home  Pain/Comfort:  · Pain Rating 1: 0/10  · Pain Rating Post-Intervention 1: 0/10    Living Environment:  Patient lives with his wife in a single story home.     Prior Level of Function:   Patient reports being independent with mobility & with ADLs. Patient uses DME as follows: none.     Roles/Repsonsibilities:   Work: Employed. Drive: yes. Managing Medicines/Managing Home: yes.     Patient reports they will have assistance from family upon discharge.    Objective:   Patient found with: telemetry, pulse ox (continuous), peripheral IV, blood pressure cuff     General Precautions: Standard, fall   Orthopedic Precautions:N/A   Braces: N/A   Oxygen Device: Room  Air  Vitals: BP (!) 145/68 (BP Location: Right arm, Patient Position: Lying)   Pulse 67   Temp 98 °F (36.7 °C) (Oral)   Resp 20   Ht 6' (1.829 m)   Wt 92.8 kg (204 lb 9.4 oz)   SpO2 99%   BMI 27.75 kg/m²     Exams:  · Cognition:   · Alert and Cooperative  · AxOx4  · Command following: Follows multistep  commands  · Fluency: clear/fluent  · Hearing: Intact  · Vision:  Intact visual fields  · Skin Integrity: WFL  · Sensation: WFL  · Coordination: WFL  · LE Strength:  · L Lower Extremity: WFL  · R Lower Extremity: WFL  · LE ROM:  · L Lower Extremity: WFL  · R Lower Extremity: WFL      Outcome Measures:  AM-PAC 6 CLICK MOBILITY  Turning over in bed (including adjusting bedclothes, sheets and blankets)?: 4  Sitting down on and standing up from a chair with arms (e.g., wheelchair, bedside commode, etc.): 4  Moving from lying on back to sitting on the side of the bed?: 4  Moving to and from a bed to a chair (including a wheelchair)?: 4  Need to walk in hospital room?: 4  Climbing 3-5 steps with a railing?: 4  Basic Mobility Total Score: 24     Functional Mobility:    Bed Mobility:  · Rolling/Turning to Left: independence  · Rolling/Turning to Right: independence  · Supine to Sit: independence  · Scooting anteriorly to EOB to have both feet planted on floor: independence  · Sit to Supine: independence    Sitting Balance at Edge of Bed:   Assistance Level Required: Leon    Transfers:   · Sit <> Stand Transfer: supervision with no assistive device   · Standing Tolerance: supervision with no assistive device   · Deviations: none noted  · Bed <> Chair Transfer: Step Transfer technique with supervision with no assistive device    Gait:   · Patient ambulated: multiple laps in room   · Patient required: supervision  · Patient used: no assistive device  · Gait Deviation(s): none noted      Education:   Patient was educated on continued safe mobility throughout this admission.       Patient left HOB elevated with all  lines intact, call button in reach, and RN notified.    GOALS:   Multidisciplinary Problems     Physical Therapy Goals     Not on file          Multidisciplinary Problems (Resolved)        Problem: Physical Therapy    Goal Priority Disciplines Outcome Goal Variances Interventions   Physical Therapy Goal   (Resolved)     PT, PT/OT Met     Description: PT evaluation complete - no acute deficits.                   History:     Past Medical History:   Diagnosis Date    Diabetes mellitus     Hypertension        Past Surgical History:   Procedure Laterality Date    CHOLECYSTECTOMY         Time Tracking:     PT Received On: 07/04/22  PT Start Time: 1345     PT Stop Time: 1353  PT Total Time (min): 8 min     Billable Minutes: Evaluation 8    Rose Teague PT, DPT  7/4/2022

## 2022-07-05 LAB — POCT GLUCOSE: 89 MG/DL (ref 70–110)

## 2023-06-16 ENCOUNTER — PATIENT MESSAGE (OUTPATIENT)
Dept: PODIATRY | Facility: CLINIC | Age: 63
End: 2023-06-16
Payer: MEDICAID

## 2024-05-13 ENCOUNTER — TELEPHONE (OUTPATIENT)
Dept: PHARMACY | Facility: CLINIC | Age: 64
End: 2024-05-13
Payer: MEDICAID